# Patient Record
Sex: FEMALE | Race: WHITE | NOT HISPANIC OR LATINO
[De-identification: names, ages, dates, MRNs, and addresses within clinical notes are randomized per-mention and may not be internally consistent; named-entity substitution may affect disease eponyms.]

---

## 2017-08-07 PROBLEM — Z00.00 ENCOUNTER FOR PREVENTIVE HEALTH EXAMINATION: Status: ACTIVE | Noted: 2017-08-07

## 2017-09-14 ENCOUNTER — APPOINTMENT (OUTPATIENT)
Age: 57
End: 2017-09-14
Payer: COMMERCIAL

## 2017-09-14 PROCEDURE — 93000 ELECTROCARDIOGRAM COMPLETE: CPT

## 2017-09-14 PROCEDURE — 99244 OFF/OP CNSLTJ NEW/EST MOD 40: CPT

## 2017-09-19 PROCEDURE — 93224 XTRNL ECG REC UP TO 48 HRS: CPT

## 2017-09-21 ENCOUNTER — APPOINTMENT (OUTPATIENT)
Age: 57
End: 2017-09-21
Payer: COMMERCIAL

## 2017-09-21 PROCEDURE — 99214 OFFICE O/P EST MOD 30 MIN: CPT

## 2017-10-31 ENCOUNTER — RECORD ABSTRACTING (OUTPATIENT)
Age: 57
End: 2017-10-31

## 2017-10-31 DIAGNOSIS — Z82.49 FAMILY HISTORY OF ISCHEMIC HEART DISEASE AND OTHER DISEASES OF THE CIRCULATORY SYSTEM: ICD-10-CM

## 2017-10-31 DIAGNOSIS — Z83.49 FAMILY HISTORY OF OTHER ENDOCRINE, NUTRITIONAL AND METABOLIC DISEASES: ICD-10-CM

## 2017-10-31 DIAGNOSIS — Z87.39 PERSONAL HISTORY OF OTHER DISEASES OF THE MUSCULOSKELETAL SYSTEM AND CONNECTIVE TISSUE: ICD-10-CM

## 2017-10-31 DIAGNOSIS — Z82.0 FAMILY HISTORY OF EPILEPSY AND OTHER DISEASES OF THE NERVOUS SYSTEM: ICD-10-CM

## 2017-10-31 DIAGNOSIS — M19.90 UNSPECIFIED OSTEOARTHRITIS, UNSPECIFIED SITE: ICD-10-CM

## 2017-10-31 DIAGNOSIS — Z87.09 PERSONAL HISTORY OF OTHER DISEASES OF THE RESPIRATORY SYSTEM: ICD-10-CM

## 2017-10-31 DIAGNOSIS — Z80.1 FAMILY HISTORY OF MALIGNANT NEOPLASM OF TRACHEA, BRONCHUS AND LUNG: ICD-10-CM

## 2017-10-31 DIAGNOSIS — Z82.5 FAMILY HISTORY OF ASTHMA AND OTHER CHRONIC LOWER RESPIRATORY DISEASES: ICD-10-CM

## 2017-10-31 RX ORDER — MULTIVITAMIN
CAPSULE ORAL
Refills: 0 | Status: ACTIVE | COMMUNITY

## 2017-10-31 RX ORDER — ALBUTEROL SULFATE 90 UG/1
108 AEROSOL, METERED RESPIRATORY (INHALATION)
Refills: 0 | Status: ACTIVE | COMMUNITY

## 2017-10-31 RX ORDER — UBIDECARENONE/VIT E ACET 100MG-5
50 MCG CAPSULE ORAL
Refills: 0 | Status: ACTIVE | COMMUNITY

## 2017-10-31 RX ORDER — FLUTICASONE PROPIONATE 50 MCG
50 SPRAY, SUSPENSION NASAL DAILY
Refills: 0 | Status: ACTIVE | COMMUNITY

## 2017-10-31 RX ORDER — TELMISARTAN 80 MG/1
80 TABLET ORAL DAILY
Refills: 0 | Status: ACTIVE | COMMUNITY

## 2017-11-07 ENCOUNTER — APPOINTMENT (OUTPATIENT)
Age: 57
End: 2017-11-07
Payer: COMMERCIAL

## 2017-11-07 PROCEDURE — 93880 EXTRACRANIAL BILAT STUDY: CPT

## 2017-11-07 PROCEDURE — 93306 TTE W/DOPPLER COMPLETE: CPT

## 2017-11-10 ENCOUNTER — TRANSCRIPTION ENCOUNTER (OUTPATIENT)
Age: 57
End: 2017-11-10

## 2017-12-07 ENCOUNTER — APPOINTMENT (OUTPATIENT)
Age: 57
End: 2017-12-07
Payer: COMMERCIAL

## 2017-12-07 VITALS
HEIGHT: 64 IN | OXYGEN SATURATION: 97 % | HEART RATE: 80 BPM | BODY MASS INDEX: 47.8 KG/M2 | SYSTOLIC BLOOD PRESSURE: 132 MMHG | DIASTOLIC BLOOD PRESSURE: 80 MMHG | WEIGHT: 280 LBS

## 2017-12-07 PROCEDURE — 99214 OFFICE O/P EST MOD 30 MIN: CPT

## 2018-02-22 ENCOUNTER — APPOINTMENT (OUTPATIENT)
Dept: CARDIOLOGY | Facility: CLINIC | Age: 58
End: 2018-02-22
Payer: COMMERCIAL

## 2018-02-22 ENCOUNTER — RECORD ABSTRACTING (OUTPATIENT)
Age: 58
End: 2018-02-22

## 2018-02-22 VITALS
BODY MASS INDEX: 46.78 KG/M2 | SYSTOLIC BLOOD PRESSURE: 120 MMHG | OXYGEN SATURATION: 98 % | HEIGHT: 64 IN | HEART RATE: 81 BPM | WEIGHT: 274 LBS | DIASTOLIC BLOOD PRESSURE: 70 MMHG

## 2018-02-22 PROCEDURE — 99214 OFFICE O/P EST MOD 30 MIN: CPT

## 2018-02-22 RX ORDER — MONTELUKAST SODIUM 10 MG/1
10 TABLET, FILM COATED ORAL DAILY
Refills: 0 | Status: DISCONTINUED | COMMUNITY
End: 2018-02-22

## 2018-02-22 RX ORDER — TOFACITINIB 11 MG/1
11 TABLET, FILM COATED, EXTENDED RELEASE ORAL DAILY
Refills: 0 | Status: DISCONTINUED | COMMUNITY
End: 2018-02-22

## 2018-02-23 ENCOUNTER — RX RENEWAL (OUTPATIENT)
Age: 58
End: 2018-02-23

## 2018-07-02 ENCOUNTER — MEDICATION RENEWAL (OUTPATIENT)
Age: 58
End: 2018-07-02

## 2018-10-08 ENCOUNTER — RECORD ABSTRACTING (OUTPATIENT)
Age: 58
End: 2018-10-08

## 2018-10-11 ENCOUNTER — APPOINTMENT (OUTPATIENT)
Dept: CARDIOLOGY | Facility: CLINIC | Age: 58
End: 2018-10-11
Payer: COMMERCIAL

## 2018-10-11 ENCOUNTER — MEDICATION RENEWAL (OUTPATIENT)
Age: 58
End: 2018-10-11

## 2018-10-11 VITALS
WEIGHT: 266 LBS | SYSTOLIC BLOOD PRESSURE: 114 MMHG | HEIGHT: 64 IN | BODY MASS INDEX: 45.41 KG/M2 | DIASTOLIC BLOOD PRESSURE: 70 MMHG | HEART RATE: 86 BPM | OXYGEN SATURATION: 96 %

## 2018-10-11 PROCEDURE — 99214 OFFICE O/P EST MOD 30 MIN: CPT

## 2018-10-11 PROCEDURE — 93000 ELECTROCARDIOGRAM COMPLETE: CPT

## 2018-10-11 RX ORDER — FOLIC ACID 1 MG/1
1 TABLET ORAL DAILY
Refills: 0 | Status: DISCONTINUED | COMMUNITY
End: 2018-10-11

## 2018-10-11 RX ORDER — MULTIVIT-MIN/IRON/FOLIC ACID/K 18-600-40
CAPSULE ORAL DAILY
Refills: 0 | Status: ACTIVE | COMMUNITY

## 2018-10-16 PROCEDURE — 93224 XTRNL ECG REC UP TO 48 HRS: CPT

## 2018-10-29 ENCOUNTER — APPOINTMENT (OUTPATIENT)
Dept: CARDIOLOGY | Facility: CLINIC | Age: 58
End: 2018-10-29
Payer: COMMERCIAL

## 2018-10-29 PROCEDURE — 93880 EXTRACRANIAL BILAT STUDY: CPT

## 2018-10-29 PROCEDURE — 93306 TTE W/DOPPLER COMPLETE: CPT

## 2018-11-12 ENCOUNTER — APPOINTMENT (OUTPATIENT)
Dept: CARDIOLOGY | Facility: CLINIC | Age: 58
End: 2018-11-12
Payer: COMMERCIAL

## 2018-11-12 PROCEDURE — 78452 HT MUSCLE IMAGE SPECT MULT: CPT

## 2018-11-12 PROCEDURE — 93015 CV STRESS TEST SUPVJ I&R: CPT

## 2018-11-12 PROCEDURE — A9502: CPT

## 2018-12-05 ENCOUNTER — RX RENEWAL (OUTPATIENT)
Age: 58
End: 2018-12-05

## 2018-12-18 ENCOUNTER — RECORD ABSTRACTING (OUTPATIENT)
Age: 58
End: 2018-12-18

## 2018-12-20 ENCOUNTER — APPOINTMENT (OUTPATIENT)
Dept: CARDIOLOGY | Facility: CLINIC | Age: 58
End: 2018-12-20
Payer: COMMERCIAL

## 2018-12-20 VITALS
BODY MASS INDEX: 44.39 KG/M2 | HEART RATE: 80 BPM | DIASTOLIC BLOOD PRESSURE: 70 MMHG | OXYGEN SATURATION: 98 % | SYSTOLIC BLOOD PRESSURE: 140 MMHG | HEIGHT: 64 IN | WEIGHT: 260 LBS

## 2018-12-20 PROCEDURE — 99214 OFFICE O/P EST MOD 30 MIN: CPT

## 2019-01-17 ENCOUNTER — MEDICATION RENEWAL (OUTPATIENT)
Age: 59
End: 2019-01-17

## 2019-04-18 ENCOUNTER — TRANSCRIPTION ENCOUNTER (OUTPATIENT)
Age: 59
End: 2019-04-18

## 2019-07-01 ENCOUNTER — APPOINTMENT (OUTPATIENT)
Dept: CARDIOLOGY | Facility: CLINIC | Age: 59
End: 2019-07-01
Payer: COMMERCIAL

## 2019-07-01 VITALS
BODY MASS INDEX: 47.8 KG/M2 | HEIGHT: 64 IN | OXYGEN SATURATION: 97 % | DIASTOLIC BLOOD PRESSURE: 80 MMHG | SYSTOLIC BLOOD PRESSURE: 112 MMHG | WEIGHT: 280 LBS | HEART RATE: 86 BPM

## 2019-07-01 DIAGNOSIS — Z87.898 PERSONAL HISTORY OF OTHER SPECIFIED CONDITIONS: ICD-10-CM

## 2019-07-01 DIAGNOSIS — I34.0 NONRHEUMATIC MITRAL (VALVE) INSUFFICIENCY: ICD-10-CM

## 2019-07-01 DIAGNOSIS — I07.1 RHEUMATIC TRICUSPID INSUFFICIENCY: ICD-10-CM

## 2019-07-01 DIAGNOSIS — E06.3 AUTOIMMUNE THYROIDITIS: ICD-10-CM

## 2019-07-01 PROCEDURE — 99215 OFFICE O/P EST HI 40 MIN: CPT

## 2019-11-04 ENCOUNTER — APPOINTMENT (OUTPATIENT)
Dept: CARDIOLOGY | Facility: CLINIC | Age: 59
End: 2019-11-04
Payer: COMMERCIAL

## 2019-11-04 ENCOUNTER — NON-APPOINTMENT (OUTPATIENT)
Age: 59
End: 2019-11-04

## 2019-11-04 VITALS
HEIGHT: 64 IN | BODY MASS INDEX: 50.02 KG/M2 | OXYGEN SATURATION: 98 % | WEIGHT: 293 LBS | SYSTOLIC BLOOD PRESSURE: 130 MMHG | HEART RATE: 83 BPM | DIASTOLIC BLOOD PRESSURE: 72 MMHG

## 2019-11-04 PROCEDURE — 99214 OFFICE O/P EST MOD 30 MIN: CPT

## 2019-11-04 PROCEDURE — 93000 ELECTROCARDIOGRAM COMPLETE: CPT

## 2019-11-04 NOTE — REVIEW OF SYSTEMS
[Feeling Fatigued] : feeling fatigued [Blurry Vision] : blurred vision [Heartburn] : heartburn [Joint Pain] : joint pain [Negative] : Heme/Lymph [Shortness Of Breath] : no shortness of breath [Dyspnea on exertion] : dyspnea during exertion [Palpitations] : palpitations

## 2019-11-04 NOTE — PHYSICAL EXAM
[Normal Appearance] : normal appearance [Eyelids - No Xanthelasma] : the eyelids demonstrated no xanthelasmas [No Oral Pallor] : no oral pallor [Normal Jugular Venous V Waves Present] : normal jugular venous V waves present [Respiration, Rhythm And Depth] : normal respiratory rhythm and effort [Heart Rate And Rhythm] : heart rate and rhythm were normal [Heart Sounds] : normal S1 and S2 [Bowel Sounds] : normal bowel sounds [Abdomen Soft] : soft [Abnormal Walk] : normal gait [Nail Clubbing] : no clubbing of the fingernails [Cyanosis, Localized] : no localized cyanosis [] : no rash [Oriented To Time, Place, And Person] : oriented to person, place, and time [FreeTextEntry1] : obesity

## 2019-11-04 NOTE — REASON FOR VISIT
[Follow-Up - Clinic] : a clinic follow-up of [FreeTextEntry2] : palpitatons, dizziness, HTN, HL, obestity, KEVIN

## 2019-11-04 NOTE — DISCUSSION/SUMMARY
[FreeTextEntry1] : Willy is a 59-year-old female with medical history detailed above and active medical issues including:\par \par - Patient has dyspnea with moderate exertion normal perfusion Myoview stress test normal LVEF November 2018\par \par - Palpitations on diltiazem. Holter monitor for evaluation of possible arrhythmia.  Patient will have 2-D echocardiogram to assess for  LV systolic function, wall motion and  structural heart disease. \par \par - Hyperlipidemia on atorvastatin well-tolerated\par \par - Obesity. Discussed calorie reduction and increased exercise as a weight reduction strategy.\par \par - Sleep apnea using CPAP\par \par - History of thyroid nodule followed by endocrinologist\par \par Advised patient to follow active lifestyle with regular cardiovascular exercise. Patient educated on lifestyle and diet modification with low sodium low fat diet and avoidance of excessive alcohol. Patient is aware to call with any symptoms or concerns. \par \par Patient will be seen in cardiology follow up 6 months. Current cardiac medications remain unchanged. Repeat labs will be ordered with PMD.\par \par Willy will followup with Dr Jomar Teague for primary care\par \par

## 2019-11-04 NOTE — HISTORY OF PRESENT ILLNESS
[FreeTextEntry1] : Willy is a 59-year-old female with history of hypertension, hyperlipidemia, palpitations, obesity,  KEVIN using CPAP, thyroid nodule, MAZARIEGOS, atypical chest pain.\par \par Patient has dyspnea on exertion, palpitations, fluttering no associated symptoms. Cardiovascular review of symptoms is negative for exertional chest pain, dizziness or syncope.  No PND or orthopnea leg edema.  No bleeding or black stool.\par \par Patient is walking less than 10 minutes with chronic left knee pain prior arthroscopy.  Patient has recent sinus infection completed 2 courses of antibiotic therapy and steroid taper. \par \par \par \par

## 2019-11-04 NOTE — ASSESSMENT
[FreeTextEntry1] : \par Chest pain.  Overall seems to be atypical, most likely from GERD.  She has been advised to avoid citrus fruit juices.  Also, I told her not to eat three hours prior to going to bed.  Repeat GI consultation.\par \par Shortness of breath on more than usual exertion.  She had nuclear stress test on November 12, 2018 which was negative and echocardiogram showed normal LV size and wall motion and normal LVEF.  No need to repeat noninvasive cardiac testing at this point.\par \par Hypertension.  Low-salt diet.  Continue current medication.\par \par Dyslipidemia.  Low-cholesterol diet.  Continue current medication.\par \par Hypothyroidism.  Continue current medication.\par \par Obesity.  Weight reduction by diet and exercise.\par \par Sleep apnea syndrome.  Compliance to CPAP has been stressed upon her.\par \par Aggressive risk factor modification has been discussed.  She will be reevaluated by us in three months.\par

## 2019-11-06 PROCEDURE — 93224 XTRNL ECG REC UP TO 48 HRS: CPT

## 2019-12-16 ENCOUNTER — APPOINTMENT (OUTPATIENT)
Dept: CARDIOLOGY | Facility: CLINIC | Age: 59
End: 2019-12-16
Payer: COMMERCIAL

## 2019-12-16 PROCEDURE — 93306 TTE W/DOPPLER COMPLETE: CPT

## 2019-12-23 ENCOUNTER — APPOINTMENT (OUTPATIENT)
Dept: CARDIOLOGY | Facility: CLINIC | Age: 59
End: 2019-12-23

## 2020-04-06 ENCOUNTER — TRANSCRIPTION ENCOUNTER (OUTPATIENT)
Age: 60
End: 2020-04-06

## 2020-07-10 ENCOUNTER — TRANSCRIPTION ENCOUNTER (OUTPATIENT)
Age: 60
End: 2020-07-10

## 2020-08-03 ENCOUNTER — APPOINTMENT (OUTPATIENT)
Dept: CARDIOLOGY | Facility: CLINIC | Age: 60
End: 2020-08-03
Payer: COMMERCIAL

## 2020-08-11 ENCOUNTER — APPOINTMENT (OUTPATIENT)
Dept: CARDIOLOGY | Facility: CLINIC | Age: 60
End: 2020-08-11
Payer: COMMERCIAL

## 2020-08-11 VITALS
SYSTOLIC BLOOD PRESSURE: 132 MMHG | DIASTOLIC BLOOD PRESSURE: 84 MMHG | OXYGEN SATURATION: 97 % | BODY MASS INDEX: 50.02 KG/M2 | TEMPERATURE: 97.8 F | WEIGHT: 293 LBS | HEART RATE: 93 BPM | HEIGHT: 64 IN

## 2020-08-11 PROCEDURE — 99214 OFFICE O/P EST MOD 30 MIN: CPT

## 2020-08-11 NOTE — ADDENDUM
[FreeTextEntry1] : Please note the patient was reviewed with the PA.\par I was physically present during the service of the patient\par I was directly involved in the management plan and recommendations of care provided to the patient. \par I personally reviewed the history and physical exam and plan as documented by the PA above.\par \par Pardeep Flowers DO, FACC, RPVI\par Cardiologist\par 08/11/2020\par \par

## 2020-08-11 NOTE — PHYSICAL EXAM
[Normal Appearance] : normal appearance [No Deformities] : no deformities [] : no respiratory distress [Respiration, Rhythm And Depth] : normal respiratory rhythm and effort [Heart Rate And Rhythm] : heart rate and rhythm were normal [Heart Sounds] : normal S1 and S2 [Bowel Sounds] : normal bowel sounds [Abdomen Soft] : soft [Skin Color & Pigmentation] : normal skin color and pigmentation [Mood] : the mood was normal [Affect] : the affect was normal [FreeTextEntry1] : minimal edema with 2+ b/l l/e distal pulse

## 2020-08-11 NOTE — HISTORY OF PRESENT ILLNESS
[FreeTextEntry1] : Mrs. Ng is a pleasant 60 year old female that came in today 8-11-20 for routine follow up. \par \par As you know she has a history of hypertension, hyperlipidemia, palpitations, obesity,  KEVIN using CPAP, thyroid nodule, MAZARIEGOS, h/o atypical chest pain, asthma, family h/o CAD and asthma. (+covid May, June 8th negative)\par \par Her dyspnea has improved since last visit (she says it's not much to speak of), it is noted to be mild on exertion (upstairs, rarely at rest, increase with humidity). Note h/o asthma, followed by Dr. Funk, pulmonary (has apt in September). \par Rare positional dizziness with bending over, no syncope, chest pain,  palpitations or orthopnea.  \par \par Limited with exertion due to her knee (pending surgical repair end of august). \par She has been on and off prednisone 6x since march (with covid and sinus infections), she in turn continues to gain weight and can't lose it. \par \par Labs/tests\par labs 7-28-20 glucose 105, bun/creat 12/0.83, Na+ 142, K 4.3, AST 15, aLT 15, LDL 84, triglycerides 377, HDL 42\par Dual 11-12-18 mod yair 10min 6sec with mild defect in aprical wall that is fixed (prominent apical cleft-normal variant)\par echo 12-16-19 ef 60-65% with normal ventriclar functioni. mild LVH, mild MR/tr, minimal pr, mild phtn pasp 43mmhg. \par carotid ul/s 10- minimal thickening\par

## 2020-08-11 NOTE — REVIEW OF SYSTEMS
[Shortness Of Breath] : shortness of breath [Chest Pain] : no chest pain [Palpitations] : no palpitations [Dizziness] : no dizziness

## 2020-12-08 ENCOUNTER — APPOINTMENT (OUTPATIENT)
Dept: CARDIOLOGY | Facility: CLINIC | Age: 60
End: 2020-12-08

## 2021-02-15 ENCOUNTER — APPOINTMENT (OUTPATIENT)
Dept: CARDIOLOGY | Facility: CLINIC | Age: 61
End: 2021-02-15
Payer: COMMERCIAL

## 2021-02-15 VITALS
BODY MASS INDEX: 50.02 KG/M2 | TEMPERATURE: 97.6 F | HEIGHT: 64 IN | HEART RATE: 90 BPM | WEIGHT: 293 LBS | OXYGEN SATURATION: 97 % | SYSTOLIC BLOOD PRESSURE: 126 MMHG | DIASTOLIC BLOOD PRESSURE: 80 MMHG

## 2021-02-15 PROCEDURE — 99072 ADDL SUPL MATRL&STAF TM PHE: CPT

## 2021-02-15 PROCEDURE — 99214 OFFICE O/P EST MOD 30 MIN: CPT

## 2021-02-15 PROCEDURE — 93000 ELECTROCARDIOGRAM COMPLETE: CPT

## 2021-02-15 RX ORDER — BUDESONIDE AND FORMOTEROL FUMARATE DIHYDRATE 80; 4.5 UG/1; UG/1
80-4.5 AEROSOL RESPIRATORY (INHALATION) DAILY
Refills: 0 | Status: DISCONTINUED | COMMUNITY
End: 2021-02-15

## 2021-02-15 NOTE — REVIEW OF SYSTEMS
[Feeling Fatigued] : feeling fatigued [Blurry Vision] : blurred vision [Dyspnea on exertion] : dyspnea during exertion [Palpitations] : palpitations [Heartburn] : heartburn [Joint Pain] : joint pain [Negative] : Heme/Lymph [Shortness Of Breath] : no shortness of breath

## 2021-02-15 NOTE — PHYSICAL EXAM
[Normal Appearance] : normal appearance [Eyelids - No Xanthelasma] : the eyelids demonstrated no xanthelasmas [No Oral Pallor] : no oral pallor [Normal Jugular Venous V Waves Present] : normal jugular venous V waves present [Respiration, Rhythm And Depth] : normal respiratory rhythm and effort [Heart Rate And Rhythm] : heart rate and rhythm were normal [Bowel Sounds] : normal bowel sounds [Heart Sounds] : normal S1 and S2 [Abdomen Soft] : soft [Abnormal Walk] : normal gait [Nail Clubbing] : no clubbing of the fingernails [Cyanosis, Localized] : no localized cyanosis [] : no rash [Oriented To Time, Place, And Person] : oriented to person, place, and time [FreeTextEntry1] : obesity

## 2021-02-15 NOTE — DISCUSSION/SUMMARY
[FreeTextEntry1] : Willy is a 60-year-old female with medical history detailed above and active medical issues including:\par \par - Patient has dyspnea with moderate exertion normal perfusion Myoview stress test normal LVEF November 2018.  In the setting of Covid 19 pandemic we will discuss the need for stress testing next office visit.\par \par -Less frequent palpitations on diltiazem.  Patient will have 2-D echocardiogram to assess for  LV systolic function, wall motion and  structural heart disease. \par \par - Hyperlipidemia on atorvastatin well-tolerated\par \par - Obesity. Discussed calorie reduction and increased exercise as a weight reduction strategy.\par \par - Sleep apnea using CPAP\par \par - History of thyroid nodule followed by endocrinologist\par \par Advised patient to follow active lifestyle with regular cardiovascular exercise. Patient educated on lifestyle and diet modification with low sodium low fat diet and avoidance of excessive alcohol. Patient is aware to call with any symptoms or concerns. \par \par Patient will have 2-D echocardiogram to assess LV systolic function, structural heart disease.  Carotid Doppler to assess for PVD with TEB followup.  Patient will be seen in cardiology follow up 6 months. Current cardiac medications remain unchanged. Repeat labs will be ordered with PMD.\par \par Willy will followup with Dr Jomar Teague for primary care\par \par

## 2021-02-15 NOTE — HISTORY OF PRESENT ILLNESS
[FreeTextEntry1] : Willy is a 59-year-old female with history of hypertension, hyperlipidemia, palpitations, obesity,  KEVIN using CPAP, thyroid nodule, MAZARIEGOS, atypical chest pain.\par \par Patient has dyspnea on exertion, palpitations, fluttering no associated symptoms. Cardiovascular review of symptoms is negative for exertional chest pain, dizziness or syncope.  No PND or orthopnea leg edema.  No bleeding or black stool.\par \par Patient is walking less than 10 minutes with chronic left knee pain prior arthroscopy.  Patient has recent sinus infection completed 2 courses of antibiotic therapy and steroid taper. \par \par \par Echocardiogram December 2019, LVEF 60 to 65%, mild MR and TR, mild pulmonary hypertension.\par \par Exercise Myoview stress test November 2018, LVEF 66%, normal perfusion, "cleft seen, no ischemia.\par \par \par

## 2021-02-23 ENCOUNTER — APPOINTMENT (OUTPATIENT)
Dept: CARDIOLOGY | Facility: CLINIC | Age: 61
End: 2021-02-23
Payer: COMMERCIAL

## 2021-02-23 PROCEDURE — 93306 TTE W/DOPPLER COMPLETE: CPT

## 2021-02-23 PROCEDURE — 99072 ADDL SUPL MATRL&STAF TM PHE: CPT

## 2021-02-23 PROCEDURE — 93880 EXTRACRANIAL BILAT STUDY: CPT

## 2021-03-09 ENCOUNTER — APPOINTMENT (OUTPATIENT)
Dept: CARDIOLOGY | Facility: CLINIC | Age: 61
End: 2021-03-09
Payer: COMMERCIAL

## 2021-03-09 PROCEDURE — 99443: CPT

## 2021-05-13 ENCOUNTER — APPOINTMENT (OUTPATIENT)
Dept: PULMONOLOGY | Facility: CLINIC | Age: 61
End: 2021-05-13
Payer: COMMERCIAL

## 2021-05-13 VITALS
DIASTOLIC BLOOD PRESSURE: 83 MMHG | SYSTOLIC BLOOD PRESSURE: 137 MMHG | TEMPERATURE: 97.4 F | OXYGEN SATURATION: 99 % | HEART RATE: 76 BPM

## 2021-05-13 PROCEDURE — 99214 OFFICE O/P EST MOD 30 MIN: CPT

## 2021-05-13 PROCEDURE — 99072 ADDL SUPL MATRL&STAF TM PHE: CPT

## 2021-05-13 RX ORDER — CEFDINIR 300 MG/1
300 CAPSULE ORAL
Qty: 28 | Refills: 0 | Status: DISCONTINUED | COMMUNITY
Start: 2021-01-23 | End: 2021-05-13

## 2021-05-13 RX ORDER — FAMOTIDINE 20 MG/1
20 TABLET, FILM COATED ORAL
Qty: 180 | Refills: 0 | Status: DISCONTINUED | COMMUNITY
Start: 2020-08-14 | End: 2021-05-13

## 2021-05-13 RX ORDER — PREDNISONE 50 MG/1
50 TABLET ORAL
Qty: 7 | Refills: 0 | Status: DISCONTINUED | COMMUNITY
Start: 2021-01-27 | End: 2021-05-13

## 2021-05-13 RX ORDER — METHYLPREDNISOLONE 4 MG/1
4 TABLET ORAL
Qty: 21 | Refills: 0 | Status: DISCONTINUED | COMMUNITY
Start: 2020-10-14 | End: 2021-05-13

## 2021-05-13 RX ORDER — LEVOFLOXACIN 500 MG/1
500 TABLET, FILM COATED ORAL
Qty: 7 | Refills: 0 | Status: DISCONTINUED | COMMUNITY
Start: 2020-10-14 | End: 2021-05-13

## 2021-05-13 RX ORDER — METHOCARBAMOL 500 MG/1
500 TABLET, FILM COATED ORAL
Qty: 20 | Refills: 0 | Status: DISCONTINUED | COMMUNITY
Start: 2020-11-28 | End: 2021-05-13

## 2021-05-13 RX ORDER — NAPROXEN 500 MG/1
500 TABLET ORAL
Qty: 20 | Refills: 0 | Status: DISCONTINUED | COMMUNITY
Start: 2020-11-28 | End: 2021-05-13

## 2021-05-13 RX ORDER — PREDNISONE 20 MG/1
20 TABLET ORAL
Qty: 6 | Refills: 0 | Status: DISCONTINUED | COMMUNITY
Start: 2021-01-23 | End: 2021-05-13

## 2021-05-13 RX ORDER — GABAPENTIN 300 MG/1
300 CAPSULE ORAL
Qty: 20 | Refills: 0 | Status: DISCONTINUED | COMMUNITY
Start: 2020-11-28 | End: 2021-05-13

## 2021-05-13 RX ORDER — LEVOFLOXACIN 750 MG/1
750 TABLET, FILM COATED ORAL
Qty: 7 | Refills: 0 | Status: DISCONTINUED | COMMUNITY
Start: 2021-01-25 | End: 2021-05-13

## 2021-05-13 RX ORDER — OXYCODONE AND ACETAMINOPHEN 5; 325 MG/1; MG/1
5-325 TABLET ORAL
Qty: 30 | Refills: 0 | Status: DISCONTINUED | COMMUNITY
Start: 2020-08-27 | End: 2021-05-13

## 2021-05-13 RX ORDER — AMOXICILLIN AND CLAVULANATE POTASSIUM 875; 125 MG/1; MG/1
875-125 TABLET, COATED ORAL
Qty: 28 | Refills: 0 | Status: DISCONTINUED | COMMUNITY
Start: 2020-12-01 | End: 2021-05-13

## 2021-05-13 RX ORDER — BENZONATATE 200 MG/1
200 CAPSULE ORAL
Qty: 20 | Refills: 0 | Status: DISCONTINUED | COMMUNITY
Start: 2021-01-23 | End: 2021-05-13

## 2021-05-13 NOTE — PHYSICAL EXAM
[No Acute Distress] : no acute distress [Normal Oropharynx] : normal oropharynx [Normal Appearance] : normal appearance [No Neck Mass] : no neck mass [Normal Rate/Rhythm] : normal rate/rhythm [Normal S1, S2] : normal s1, s2 [No Murmurs] : no murmurs [No Resp Distress] : no resp distress [Clear to Auscultation Bilaterally] : clear to auscultation bilaterally [No Abnormalities] : no abnormalities [Benign] : benign [Normal Gait] : normal gait [No Clubbing] : no clubbing [No Cyanosis] : no cyanosis [No Edema] : no edema [FROM] : FROM [Normal Color/ Pigmentation] : normal color/ pigmentation [No Focal Deficits] : no focal deficits [Oriented x3] : oriented x3 [Normal Affect] : normal affect [TextBox_2] : heavy set

## 2021-05-13 NOTE — DISCUSSION/SUMMARY
[FreeTextEntry1] : pt with asthma and overall minimal worsening likely from acute sinusitis\par will start augmentin 875 bid #42\par will cont inc dose symbicort and singulair\par pt ot obtain blood test from allergist for me\par The patient understands and agrees with plan of care.\par Today's office visit encompassed 32 minutes. I conducted an extensive history ,physical exam and reviewed diagnosis and treatment options  including diagnostic tests,radiologic studies including  cat scans  and the use of prescription medication.

## 2021-05-13 NOTE — HISTORY OF PRESENT ILLNESS
[Never] : never [Obstructive Sleep Apnea] : obstructive sleep apnea [CPAP:] : CPAP [TextBox_4] : 61 female with hx asthma\par develop sinus infection  treated 4 weeks ago with  and now  with recurrent green drainage\par ?recent  dx CVID by immunologist\par no sob  intermittent dyspnea no wheeze  and using neb  worse since nasal drainage\par some dec in activity from knee pain not pulm  limitation\par no nite awakening and using cpap nitely\par allergist inc symbicort 160 2 bid and added singulair 1 qd \par

## 2021-08-23 ENCOUNTER — APPOINTMENT (OUTPATIENT)
Dept: CARDIOLOGY | Facility: CLINIC | Age: 61
End: 2021-08-23
Payer: MEDICARE

## 2021-08-23 ENCOUNTER — NON-APPOINTMENT (OUTPATIENT)
Age: 61
End: 2021-08-23

## 2021-08-23 VITALS
HEIGHT: 64 IN | WEIGHT: 293 LBS | HEART RATE: 81 BPM | DIASTOLIC BLOOD PRESSURE: 70 MMHG | SYSTOLIC BLOOD PRESSURE: 132 MMHG | BODY MASS INDEX: 50.02 KG/M2 | TEMPERATURE: 98.2 F | OXYGEN SATURATION: 97 %

## 2021-08-23 PROCEDURE — 99214 OFFICE O/P EST MOD 30 MIN: CPT

## 2021-08-23 PROCEDURE — 93000 ELECTROCARDIOGRAM COMPLETE: CPT

## 2021-08-23 NOTE — PHYSICAL EXAM
[Normal Appearance] : normal appearance [Eyelids - No Xanthelasma] : the eyelids demonstrated no xanthelasmas [No Oral Pallor] : no oral pallor [Normal Jugular Venous V Waves Present] : normal jugular venous V waves present [Heart Rate And Rhythm] : heart rate and rhythm were normal [Respiration, Rhythm And Depth] : normal respiratory rhythm and effort [Heart Sounds] : normal S1 and S2 [Bowel Sounds] : normal bowel sounds [Abdomen Soft] : soft [Abnormal Walk] : normal gait [Nail Clubbing] : no clubbing of the fingernails [Cyanosis, Localized] : no localized cyanosis [] : no rash [Oriented To Time, Place, And Person] : oriented to person, place, and time [Well Developed] : well developed [Well Nourished] : well nourished [No Acute Distress] : no acute distress [Obese] : obese [Normal Conjunctiva] : normal conjunctiva [Normal Venous Pressure] : normal venous pressure [No Carotid Bruit] : no carotid bruit [Normal S1, S2] : normal S1, S2 [No Murmur] : no murmur [No Rub] : no rub [No Gallop] : no gallop [Clear Lung Fields] : clear lung fields [Good Air Entry] : good air entry [No Respiratory Distress] : no respiratory distress  [Soft] : abdomen soft [Non Tender] : non-tender [No Masses/organomegaly] : no masses/organomegaly [Normal Bowel Sounds] : normal bowel sounds [Normal Gait] : normal gait [No Edema] : no edema [No Cyanosis] : no cyanosis [No Clubbing] : no clubbing [No Varicosities] : no varicosities [No Rash] : no rash [No Skin Lesions] : no skin lesions [Moves all extremities] : moves all extremities [No Focal Deficits] : no focal deficits [Normal Speech] : normal speech [Alert and Oriented] : alert and oriented [Normal memory] : normal memory [FreeTextEntry1] : obesity

## 2021-08-23 NOTE — DISCUSSION/SUMMARY
[FreeTextEntry1] : Willy is a 61-year-old female with medical history detailed above and active medical issues including:\par \par - Patient has dyspnea with moderate exertion concerning for angina, multiple CAD risk factors.  Patient will have noninvasive testing with a Lexiscan Myoview stress test to assess for obstructive CAD, exercise-induced arrhythmia,  blood pressure response.\par \par - Recurrent palpitations on diltiazem.  Zio patch 1 week heart monitor started today\par \par - Hyperlipidemia on atorvastatin well-tolerated\par \par - Obesity. Discussed calorie reduction and increased exercise as a weight reduction strategy.\par \par - Sleep apnea using CPAP\par \par - History of thyroid nodule followed by endocrinologist\par \par Advised patient to follow active lifestyle with regular cardiovascular exercise. Patient educated on lifestyle and diet modification with low sodium low fat diet and avoidance of excessive alcohol. Patient is aware to call with any symptoms or concerns. \par \par Patient will be seen in cardiology follow-up after noninvasive testing.  Current cardiac medications remain unchanged and renewals  are up to date. Repeat labs will be ordered with PMD.\par \par Willy will followup with Dr Jomar Teague for primary care\par \par

## 2021-09-15 ENCOUNTER — APPOINTMENT (OUTPATIENT)
Dept: PULMONOLOGY | Facility: CLINIC | Age: 61
End: 2021-09-15
Payer: MEDICARE

## 2021-09-15 VITALS
OXYGEN SATURATION: 98 % | DIASTOLIC BLOOD PRESSURE: 73 MMHG | HEIGHT: 64 IN | BODY MASS INDEX: 50.02 KG/M2 | HEART RATE: 87 BPM | SYSTOLIC BLOOD PRESSURE: 111 MMHG | TEMPERATURE: 97.6 F | WEIGHT: 293 LBS

## 2021-09-15 PROCEDURE — 99214 OFFICE O/P EST MOD 30 MIN: CPT

## 2021-09-15 RX ORDER — AZELASTINE HYDROCHLORIDE 137 UG/1
137 SPRAY, METERED NASAL
Refills: 0 | Status: DISCONTINUED | COMMUNITY
End: 2021-09-15

## 2021-09-15 RX ORDER — AMOXICILLIN AND CLAVULANATE POTASSIUM 875; 125 MG/1; MG/1
875-125 TABLET, COATED ORAL
Qty: 42 | Refills: 1 | Status: COMPLETED | COMMUNITY
Start: 2021-05-13 | End: 2021-09-15

## 2021-09-15 RX ORDER — RIFAXIMIN 550 MG/1
550 TABLET ORAL
Refills: 0 | Status: DISCONTINUED | COMMUNITY
End: 2021-09-15

## 2021-09-15 NOTE — PHYSICAL EXAM
[No Acute Distress] : no acute distress [Normal Oropharynx] : normal oropharynx [Normal Appearance] : normal appearance [No Neck Mass] : no neck mass [Normal Rate/Rhythm] : normal rate/rhythm [Normal S1, S2] : normal s1, s2 [No Murmurs] : no murmurs [No Resp Distress] : no resp distress [Clear to Auscultation Bilaterally] : clear to auscultation bilaterally [No Abnormalities] : no abnormalities [Benign] : benign [Normal Gait] : normal gait [No Cyanosis] : no cyanosis [No Clubbing] : no clubbing [No Edema] : no edema [FROM] : FROM [Normal Color/ Pigmentation] : normal color/ pigmentation [No Focal Deficits] : no focal deficits [Oriented x3] : oriented x3 [Normal Affect] : normal affect [TextBox_2] : obese female

## 2021-09-15 NOTE — DISCUSSION/SUMMARY
[FreeTextEntry1] : Ms Ng has asthma and is currently well controlled.  It seems to be exacerbated by her chronic recurrent sinus infections and hopefully after her sinus surgery this variable will be resolved and she will not have these exacerbating problems.  If she is stable several weeks after the sinus surgery will consider decreasing her to Symbicort 80  2 sprays twice a day.  Patient is having sinus surgery for sinus polyps.  I discussed with her the triad of sinus polyps asthma and aspirin sensitivity.  She should avoid aspirin and nonsteroidal anti-inflammatories as these could exacerbate her asthma.  She she does not take these medicines regularly and will avoid them.  The patient understands and agrees with this plan of care.\par The patient understands and agrees with plan of care.\par Today's office visit encompassed 32 minutes. I conducted an extensive history ,physical exam and reviewed diagnosis and treatment options  including diagnostic tests,radiologic studies including  cat scans  and the use of prescription medication.

## 2021-09-15 NOTE — HISTORY OF PRESENT ILLNESS
[Never] : never [TextBox_4] : 61 female with hx asthma and for sinus surgery by [facility] for recurrent polyps\par today breathing feels good  and 2 courses of abx and steroids\par today maintenance therapy\par no wheeze sl phlegm \par full activity \par no nite awakening\par using cpap nitely and feels  good\par needs new machine so repeat study

## 2021-09-15 NOTE — REASON FOR VISIT
[Follow-Up] : a follow-up visit [Asthma] : asthma [Sleep Apnea] : sleep apnea [Shortness of Breath] : shortness of breath [TextBox_44] : 4 month f/u

## 2021-09-16 ENCOUNTER — APPOINTMENT (OUTPATIENT)
Dept: CARDIOLOGY | Facility: CLINIC | Age: 61
End: 2021-09-16
Payer: MEDICARE

## 2021-09-16 PROCEDURE — A9502: CPT

## 2021-09-16 PROCEDURE — 78452 HT MUSCLE IMAGE SPECT MULT: CPT

## 2021-09-16 PROCEDURE — 93015 CV STRESS TEST SUPVJ I&R: CPT

## 2021-10-11 ENCOUNTER — TRANSCRIPTION ENCOUNTER (OUTPATIENT)
Age: 61
End: 2021-10-11

## 2021-11-05 ENCOUNTER — TRANSCRIPTION ENCOUNTER (OUTPATIENT)
Age: 61
End: 2021-11-05

## 2021-11-08 ENCOUNTER — TRANSCRIPTION ENCOUNTER (OUTPATIENT)
Age: 61
End: 2021-11-08

## 2021-11-15 ENCOUNTER — APPOINTMENT (OUTPATIENT)
Dept: CARDIOLOGY | Facility: CLINIC | Age: 61
End: 2021-11-15
Payer: MEDICARE

## 2021-11-15 VITALS
TEMPERATURE: 97.4 F | HEIGHT: 64 IN | HEART RATE: 89 BPM | SYSTOLIC BLOOD PRESSURE: 130 MMHG | BODY MASS INDEX: 50.02 KG/M2 | OXYGEN SATURATION: 97 % | WEIGHT: 293 LBS | DIASTOLIC BLOOD PRESSURE: 78 MMHG

## 2021-11-15 PROCEDURE — 99214 OFFICE O/P EST MOD 30 MIN: CPT

## 2021-11-15 RX ORDER — DUPILUMAB 300 MG/2ML
300 INJECTION, SOLUTION SUBCUTANEOUS
Refills: 0 | Status: ACTIVE | COMMUNITY

## 2021-11-15 NOTE — PHYSICAL EXAM
[Well Developed] : well developed [Well Nourished] : well nourished [No Acute Distress] : no acute distress [Obese] : obese [Normal Conjunctiva] : normal conjunctiva [Normal Venous Pressure] : normal venous pressure [No Carotid Bruit] : no carotid bruit [Normal S1, S2] : normal S1, S2 [No Murmur] : no murmur [No Rub] : no rub [No Gallop] : no gallop [Clear Lung Fields] : clear lung fields [Good Air Entry] : good air entry [No Respiratory Distress] : no respiratory distress  [Soft] : abdomen soft [Non Tender] : non-tender [No Masses/organomegaly] : no masses/organomegaly [Normal Bowel Sounds] : normal bowel sounds [Normal Gait] : normal gait [No Edema] : no edema [No Cyanosis] : no cyanosis [No Clubbing] : no clubbing [No Varicosities] : no varicosities [No Rash] : no rash [No Skin Lesions] : no skin lesions [Moves all extremities] : moves all extremities [No Focal Deficits] : no focal deficits [Normal Speech] : normal speech [Alert and Oriented] : alert and oriented [Normal memory] : normal memory [Normal Appearance] : normal appearance [Eyelids - No Xanthelasma] : the eyelids demonstrated no xanthelasmas [No Oral Pallor] : no oral pallor [Normal Jugular Venous V Waves Present] : normal jugular venous V waves present [Respiration, Rhythm And Depth] : normal respiratory rhythm and effort [Heart Rate And Rhythm] : heart rate and rhythm were normal [Heart Sounds] : normal S1 and S2 [Bowel Sounds] : normal bowel sounds [Abdomen Soft] : soft [Abnormal Walk] : normal gait [Nail Clubbing] : no clubbing of the fingernails [Cyanosis, Localized] : no localized cyanosis [] : no rash [Oriented To Time, Place, And Person] : oriented to person, place, and time [FreeTextEntry1] : obesity

## 2021-11-15 NOTE — HISTORY OF PRESENT ILLNESS
[FreeTextEntry1] : Willy is a 61-year-old female with history of hypertension, hyperlipidemia, palpitations, obesity,  KEVIN using CPAP, thyroid nodule, MAZARIEGOS, atypical chest pain.\par \par Patient has dyspnea on exertion unchanged.  Cardiovascular review of symptoms is negative for exertional chest pain, dyspnea, palpitations, dizziness or syncope.  No PND or orthopnea leg edema.  No bleeding or black stool.\par \par Patient is walking 15 minutes with chronic left knee pain prior arthroscopy. \par \par Patient has recent sinus infection completed 2 courses of antibiotic therapy and steroid taper. \par \par Lexiscan Myoview stress test Sept 2021 LVEF 69% with normal wall motion, small anterior apical defect in the presence of breast attenuation normal wall motion likely artifact, nonischemic EKG response, no chest pain, ventricular bigeminy with baseline sinus rhythm.\par \par Echocardiogram February 2021, LVEF 65%, normal Doppler, mild pHTN.\par \par Carotid Doppler February 2021, mild nonobstructive plaque.\par \par Zio patch 1 week heart monitor August 2021, sinus rhythm with average heart rate 82, brief SVT up to 193 bpm, rare PVCs\par \par Echocardiogram December 2019, LVEF 60 to 65%, mild MR and TR, mild pulmonary hypertension.\par \par Exercise Myoview stress test November 2018, LVEF 66%, normal perfusion, "cleft seen, no ischemia.\par \par \par

## 2021-11-15 NOTE — DISCUSSION/SUMMARY
[FreeTextEntry1] : Willy is a 61-year-old female with medical history detailed above and active medical issues including:\par \par - Patient has dyspnea with moderate exertion unchanged, no significant ischemia, normal LVEF review stress test Sept 2021\par \par - Recurrent palpitations ZioPatch with brief PSVT, rare PVCs on diltiazem , normal LVEF echo Feb 2021\par \par - Hyperlipidemia on atorvastatin well-tolerated\par \par - Obesity. Discussed calorie reduction and increased exercise as a weight reduction strategy.\par \par - Sleep apnea using CPAP\par \par - History of thyroid nodule followed by endocrinologist\par \par Advised patient to follow active lifestyle with regular cardiovascular exercise. Patient educated on lifestyle and diet modification with low sodium low fat diet and avoidance of excessive alcohol. Patient is aware to call with any symptoms or concerns. \par \par Patient will be seen in cardiology follow-up 6 months same day echocardiogram.  Current cardiac medications remain unchanged and renewals  are up to date. Repeat labs will be ordered with PMD.\par \par Willy will followup with Dr Jomar Teague for primary care\par \par

## 2022-01-06 ENCOUNTER — APPOINTMENT (OUTPATIENT)
Dept: PULMONOLOGY | Facility: CLINIC | Age: 62
End: 2022-01-06

## 2022-01-10 ENCOUNTER — TRANSCRIPTION ENCOUNTER (OUTPATIENT)
Age: 62
End: 2022-01-10

## 2022-01-26 ENCOUNTER — NON-APPOINTMENT (OUTPATIENT)
Age: 62
End: 2022-01-26

## 2022-01-26 ENCOUNTER — APPOINTMENT (OUTPATIENT)
Dept: PULMONOLOGY | Facility: CLINIC | Age: 62
End: 2022-01-26
Payer: MEDICARE

## 2022-01-26 VITALS
OXYGEN SATURATION: 99 % | SYSTOLIC BLOOD PRESSURE: 134 MMHG | HEIGHT: 64 IN | TEMPERATURE: 97.6 F | WEIGHT: 293 LBS | DIASTOLIC BLOOD PRESSURE: 83 MMHG | BODY MASS INDEX: 50.02 KG/M2 | HEART RATE: 69 BPM

## 2022-01-26 PROCEDURE — 99214 OFFICE O/P EST MOD 30 MIN: CPT

## 2022-01-26 RX ORDER — MONTELUKAST 10 MG/1
10 TABLET, FILM COATED ORAL
Refills: 0 | Status: ACTIVE | COMMUNITY

## 2022-01-26 NOTE — HISTORY OF PRESENT ILLNESS
[Never] : never [Obstructive Sleep Apnea] : obstructive sleep apnea [CPAP:] : CPAP [Nasal pillow] : nasal pillow [TextBox_4] : 61 female with hx asthma and had sinus surgery and covid +Jan 2 and treated monoclonal Ab and dced\par today feels good  no sob no cough no wheeze no cp no tightness\par some improvement with sinus surgery\par full activity no limitation\par Recently started on Dupixent by primary care physician and overall feels better\par using cpap  and recent repeat study at [facility]\par

## 2022-01-26 NOTE — REASON FOR VISIT
[Follow-Up] : a follow-up visit [Asthma] : asthma [Sleep Apnea] : sleep apnea [TextBox_44] : 4 months. Pt was diagnosed with COVID-19 01/02/2022, pt was not hospitalized but did received antibody infusion at [facility]. Currently pt has no pulmonary complaints.

## 2022-01-26 NOTE — PHYSICAL EXAM
[No Acute Distress] : no acute distress [Normal Oropharynx] : normal oropharynx [Normal Appearance] : normal appearance [No Neck Mass] : no neck mass [Normal Rate/Rhythm] : normal rate/rhythm [Normal S1, S2] : normal s1, s2 [No Murmurs] : no murmurs [No Resp Distress] : no resp distress [Clear to Auscultation Bilaterally] : clear to auscultation bilaterally [No Abnormalities] : no abnormalities [Benign] : benign [Normal Gait] : normal gait [No Clubbing] : no clubbing [No Cyanosis] : no cyanosis [No Edema] : no edema [FROM] : FROM [Normal Color/ Pigmentation] : normal color/ pigmentation [No Focal Deficits] : no focal deficits [Oriented x3] : oriented x3 [Normal Affect] : normal affect [TextBox_2] : Obese female no respiratory distress

## 2022-01-26 NOTE — DISCUSSION/SUMMARY
[FreeTextEntry1] : Ms. Ng has moderate persistent asthma.  Overall she is doing well and think she might be feeling better on the Dupixent as well.  I have discussed with her decreasing the Symbicort from 160 mg to 80 mg 2 sprays twice a day.  She is eager to try this and we will start it after the current prescription runs out within the next month.  We will obtain pulmonary function test on her next visit.  The patient is tolerating her CPAP machine well and is receiving a new machine from the [facility]. \par The patient understands and agrees with plan of care.\par Today's office visit encompassed 32 minutes. I conducted an extensive history ,physical exam and reviewed diagnosis and treatment options  including diagnostic tests,radiologic studies including  cat scans  and the use of prescription medication.

## 2022-02-04 ENCOUNTER — TRANSCRIPTION ENCOUNTER (OUTPATIENT)
Age: 62
End: 2022-02-04

## 2022-03-02 ENCOUNTER — TRANSCRIPTION ENCOUNTER (OUTPATIENT)
Age: 62
End: 2022-03-02

## 2022-03-16 ENCOUNTER — TRANSCRIPTION ENCOUNTER (OUTPATIENT)
Age: 62
End: 2022-03-16

## 2022-03-20 ENCOUNTER — TRANSCRIPTION ENCOUNTER (OUTPATIENT)
Age: 62
End: 2022-03-20

## 2022-05-17 ENCOUNTER — APPOINTMENT (OUTPATIENT)
Dept: CARDIOLOGY | Facility: CLINIC | Age: 62
End: 2022-05-17
Payer: MEDICARE

## 2022-05-17 VITALS
HEART RATE: 92 BPM | DIASTOLIC BLOOD PRESSURE: 88 MMHG | HEIGHT: 64 IN | SYSTOLIC BLOOD PRESSURE: 136 MMHG | OXYGEN SATURATION: 97 % | WEIGHT: 293 LBS | TEMPERATURE: 97.3 F | BODY MASS INDEX: 50.02 KG/M2

## 2022-05-17 PROCEDURE — 99214 OFFICE O/P EST MOD 30 MIN: CPT

## 2022-05-17 PROCEDURE — 93306 TTE W/DOPPLER COMPLETE: CPT

## 2022-05-17 RX ORDER — PEPPERMINT OIL 90 MG
90 CAPSULE, DELAYED, AND EXTENDED RELEASE ORAL
Refills: 0 | Status: DISCONTINUED | COMMUNITY
End: 2022-05-17

## 2022-05-17 RX ORDER — OMEPRAZOLE 40 MG/1
40 CAPSULE, DELAYED RELEASE ORAL TWICE DAILY
Refills: 0 | Status: DISCONTINUED | COMMUNITY
End: 2022-05-17

## 2022-05-17 RX ORDER — BUDESONIDE AND FORMOTEROL FUMARATE DIHYDRATE 160; 4.5 UG/1; UG/1
160-4.5 AEROSOL RESPIRATORY (INHALATION)
Qty: 10 | Refills: 0 | Status: DISCONTINUED | COMMUNITY
Start: 2021-02-03 | End: 2022-05-17

## 2022-05-17 NOTE — DISCUSSION/SUMMARY
[FreeTextEntry1] : SCOTTIE PADGETT is a 62 year old F who presents today May 17, 2022 with the above history and the following active issues:\par \par Chest pain. MAZARIEGOS. Multiple risk factors. Note significant family hx. Keny 9/2021 with tonny infarct ischemia. Recommend cardiac cath.\par \par Above testing has been reviewed with the patient. \par High risk noninvasive imaging. \par Discussed risk of major adverse cardiovascular events without further evaluation and management. \par Recommend cardiac catheterization.\par Discussed the risks, benefits, alternatives of invasive angiography.\par Discussed potential for revascularization, percutaneous v surgical\par All questions answered.\par If escalating sx or sx at rest then 911\par Daily aspirin therapy. \par \par HTN: Moderately well controlled on Dilt.\par \par \par HLD on statin\par \par \par KEVIN\par \par Thyroid nodule; follows with endo as per prior records.\par \par Ongoing f/u with PCP. Advised to f/u with PCP re: incidental findings on prior CTs in [facility] 2018 and 2019.\par \par F/U after testing to review results (cath).\par Discussed red flag symptoms, which would warrant sooner or emergent medical evaluation.\par Any questions and concerns were addressed and resolved.\par \par Sincerely,\par Christina Wood [facility]-BC\par Patient's history, testing, and plan was reviewed with supervising physician, Dr. Patrick Valentino

## 2022-05-17 NOTE — HISTORY OF PRESENT ILLNESS
[FreeTextEntry1] : SCOTTIE PADGETT is a 62 year old female with a past medical history of hypertension, hyperlipidemia, obesity, KEVIN using CPAP, thyroid nodule.\par \par Last seen 11/15/21. In the interim there have been no hospitalizations or procedures. Reports her brother  of a heart attack last week at the age of 68. She felt an episode of chest discomfort for 20 seconds. There is MAZARIEGOS. Reports edema at times. Denies palpitations, dizziness, lightheadedness, syncope, near syncope, and claudication. Never a smoker. Was exposed to second hand smoke.\par \par /80 on my exam.\par \par Testing:\par \par Echo 22: EF 60%- [place] to mild MR. Normal wall motion. RV NWVl grossly normal LRVSF. MIld AK. \par \par Nuke 21: Lexiscan. Negative by EKG. V bigem noted. Breast attenuation artifact. Medium sized defect that is partially reversible, suggestive of infarction with tonny infarct ischemia. EF 69%.\par \par Zio 21: SR  bpm, average HR 82 bpm. ?Brief SVT. Rare v ectopy.\par \par Labs 3/31/21: WBC 5.8, Hgb 13.4, HCT 41.4, plt 218, Cr 0.81, Na 143, K 4.3, Ca 9.4, Chol 147, Trigs 272, HDL 42, LDL 62, AST 26, ALT 24, A1C 5.8, TSH 1.63, CK 51.\par \par Carotid u/s 21: BL intimal thickening.\par \par Echo 21: EF 60-65%. Minimal MR. Normal wall motion.\par \par CT Cors 18: Coronary calcium score 0. Vague nodular opacities in left lower lobe. small hiatal hernia.

## 2022-05-20 DIAGNOSIS — Z01.818 ENCOUNTER FOR OTHER PREPROCEDURAL EXAMINATION: ICD-10-CM

## 2022-05-25 ENCOUNTER — APPOINTMENT (OUTPATIENT)
Dept: ORTHOPEDIC SURGERY | Facility: CLINIC | Age: 62
End: 2022-05-25
Payer: MEDICARE

## 2022-05-25 VITALS — HEIGHT: 64 IN | WEIGHT: 293 LBS | BODY MASS INDEX: 50.02 KG/M2

## 2022-05-25 DIAGNOSIS — M48.061 SPINAL STENOSIS, LUMBAR REGION WITHOUT NEUROGENIC CLAUDICATION: ICD-10-CM

## 2022-05-25 LAB — SARS-COV-2 N GENE NPH QL NAA+PROBE: NOT DETECTED

## 2022-05-25 PROCEDURE — 99204 OFFICE O/P NEW MOD 45 MIN: CPT

## 2022-05-25 NOTE — HISTORY OF PRESENT ILLNESS
[de-identified] : Patient presents for initial encounter with lower back pain that intermittently radiates into lower extremities. Patient states she has history of lower back pain. No history of trauma. Patient states her pain is worse with extended periods of standing and walking. No changes to lower extremity strength b/l. No numbness/tingling sensation to lower extremities b/l. Treatment with OTC NSAIDs provides temporary relief. Treatment with formal physical therapy makes her symptoms worse. Patient has continued with at home exercises/stretches as best tolerated.

## 2022-05-25 NOTE — DISCUSSION/SUMMARY
[Medication Risks Reviewed] : Medication risks reviewed [de-identified] : Discussed proper body mechanics and modified physical activity to avoid aggravation of symptoms. Patient will continue with at home exercises/stretches as best tolerated. Discussed further conservative treatment in the form of NSAIDs, muscle relaxers, and injection therapy. Reviewed and discussed results of lumbar spine MRI scan from 2019. Patient given prescriptions for meloxicam and methocarbamol. Advised patient of proper prescription medication management. Patient referred to Dr. Rojo to injection therapy consultation. Patient will follow up in 5-6 weeks.

## 2022-05-25 NOTE — DATA REVIEWED
[MRI] : MRI [Lumbar Spine] : lumbar spine [CT Scan] : CT scan [Report was reviewed and noted in the chart] : The report was reviewed and noted in the chart [I independently reviewed and interpreted images and report] : I independently reviewed and interpreted images and report [I reviewed the films/CD and additionally noted] : I reviewed the films/CD and additionally noted [FreeTextEntry2] : Abdomen and pelvis CT scan 05/24/2021. DDD at L2-3 and L3-4. Spinal stenosis at L2-3. [FreeTextEntry1] : I stop paperwork reviewed

## 2022-05-27 ENCOUNTER — OUTPATIENT (OUTPATIENT)
Dept: OUTPATIENT SERVICES | Facility: HOSPITAL | Age: 62
LOS: 1 days | End: 2022-05-27
Payer: MEDICARE

## 2022-05-27 PROCEDURE — 93458 L HRT ARTERY/VENTRICLE ANGIO: CPT | Mod: 26

## 2022-05-27 PROCEDURE — 93010 ELECTROCARDIOGRAM REPORT: CPT

## 2022-06-01 DIAGNOSIS — R00.2 PALPITATIONS: ICD-10-CM

## 2022-06-01 DIAGNOSIS — R94.39 ABNORMAL RESULT OF OTHER CARDIOVASCULAR FUNCTION STUDY: ICD-10-CM

## 2022-06-01 DIAGNOSIS — M06.9 RHEUMATOID ARTHRITIS, UNSPECIFIED: ICD-10-CM

## 2022-06-01 DIAGNOSIS — E03.9 HYPOTHYROIDISM, UNSPECIFIED: ICD-10-CM

## 2022-06-01 DIAGNOSIS — I34.0 NONRHEUMATIC MITRAL (VALVE) INSUFFICIENCY: ICD-10-CM

## 2022-06-01 DIAGNOSIS — G47.33 OBSTRUCTIVE SLEEP APNEA (ADULT) (PEDIATRIC): ICD-10-CM

## 2022-06-01 DIAGNOSIS — I10 ESSENTIAL (PRIMARY) HYPERTENSION: ICD-10-CM

## 2022-06-01 DIAGNOSIS — E78.5 HYPERLIPIDEMIA, UNSPECIFIED: ICD-10-CM

## 2022-06-01 DIAGNOSIS — J45.909 UNSPECIFIED ASTHMA, UNCOMPLICATED: ICD-10-CM

## 2022-06-01 DIAGNOSIS — M19.90 UNSPECIFIED OSTEOARTHRITIS, UNSPECIFIED SITE: ICD-10-CM

## 2022-06-01 DIAGNOSIS — R06.02 SHORTNESS OF BREATH: ICD-10-CM

## 2022-06-01 DIAGNOSIS — Z82.49 FAMILY HISTORY OF ISCHEMIC HEART DISEASE AND OTHER DISEASES OF THE CIRCULATORY SYSTEM: ICD-10-CM

## 2022-06-01 DIAGNOSIS — E06.3 AUTOIMMUNE THYROIDITIS: ICD-10-CM

## 2022-06-03 ENCOUNTER — APPOINTMENT (OUTPATIENT)
Dept: CARDIOLOGY | Facility: CLINIC | Age: 62
End: 2022-06-03
Payer: MEDICARE

## 2022-06-03 VITALS
HEIGHT: 64 IN | SYSTOLIC BLOOD PRESSURE: 134 MMHG | OXYGEN SATURATION: 95 % | DIASTOLIC BLOOD PRESSURE: 72 MMHG | TEMPERATURE: 97.3 F | HEART RATE: 66 BPM | BODY MASS INDEX: 50.02 KG/M2 | WEIGHT: 293 LBS

## 2022-06-03 PROCEDURE — 99214 OFFICE O/P EST MOD 30 MIN: CPT

## 2022-06-03 RX ORDER — ASPIRIN 81 MG/1
81 TABLET, CHEWABLE ORAL
Qty: 90 | Refills: 2 | Status: DISCONTINUED | COMMUNITY
End: 2022-06-03

## 2022-06-04 RX ORDER — DILTIAZEM HYDROCHLORIDE 240 MG/1
240 CAPSULE, EXTENDED RELEASE ORAL
Qty: 90 | Refills: 3 | Status: DISCONTINUED | COMMUNITY
Start: 1900-01-01 | End: 2022-06-04

## 2022-06-23 ENCOUNTER — APPOINTMENT (OUTPATIENT)
Dept: PAIN MANAGEMENT | Facility: CLINIC | Age: 62
End: 2022-06-23

## 2022-07-18 ENCOUNTER — APPOINTMENT (OUTPATIENT)
Dept: PAIN MANAGEMENT | Facility: CLINIC | Age: 62
End: 2022-07-18

## 2022-07-20 ENCOUNTER — APPOINTMENT (OUTPATIENT)
Dept: ORTHOPEDIC SURGERY | Facility: CLINIC | Age: 62
End: 2022-07-20

## 2022-08-02 ENCOUNTER — APPOINTMENT (OUTPATIENT)
Dept: PULMONOLOGY | Facility: CLINIC | Age: 62
End: 2022-08-02

## 2022-08-05 ENCOUNTER — APPOINTMENT (OUTPATIENT)
Dept: PULMONOLOGY | Facility: CLINIC | Age: 62
End: 2022-08-05

## 2022-08-05 ENCOUNTER — NON-APPOINTMENT (OUTPATIENT)
Age: 62
End: 2022-08-05

## 2022-08-05 VITALS
TEMPERATURE: 96.1 F | OXYGEN SATURATION: 97 % | DIASTOLIC BLOOD PRESSURE: 74 MMHG | WEIGHT: 293 LBS | SYSTOLIC BLOOD PRESSURE: 138 MMHG | HEART RATE: 94 BPM | HEIGHT: 64 IN | BODY MASS INDEX: 50.02 KG/M2

## 2022-08-05 PROCEDURE — 94010 BREATHING CAPACITY TEST: CPT

## 2022-08-05 PROCEDURE — 99214 OFFICE O/P EST MOD 30 MIN: CPT | Mod: 25

## 2022-08-05 PROCEDURE — ZZZZZ: CPT

## 2022-08-05 NOTE — HISTORY OF PRESENT ILLNESS
[Never] : never [TextBox_4] : 62 male hx asthma and  obstructive sleep apnea\par today feels good no sob no cough no wheeze o chest pain no  tightness\par full activity no limitation to  activities of daily living\par remains on symbicort\par used albuterol 3 x past 1 month\par using cpa nitely and feels good ?snore\par feels refreshed in am\par still can nap during day\par compliance report perfect compliance past month

## 2022-08-05 NOTE — DISCUSSION/SUMMARY
[FreeTextEntry1] : Ms. Ng has asthma and obstructive sleep apnea.  She is well controlled on the current medicine as she has minimal albuterol needs.  She is able to perform all of her activities of daily living.  We will continue this therapy.  Patient also has obstructive sleep apnea.  She is feeling good but does tend to nap during the day occasionally.  Review of her compliance report reveals excellent compliance with AHI less than 5.  We will continue current positive pressure breathing.  The patient understands and agrees with this plan of care.\par The patient understands and agrees with plan of care.\par Today's office visit encompassed 32 minutes. I conducted an extensive history ,physical exam and reviewed diagnosis and treatment options  including diagnostic tests,radiologic studies including  cat scans  and the use of prescription medication.

## 2022-08-05 NOTE — PHYSICAL EXAM
[No Acute Distress] : no acute distress [Normal Oropharynx] : normal oropharynx [Normal Appearance] : normal appearance [No Neck Mass] : no neck mass [Normal Rate/Rhythm] : normal rate/rhythm [Normal S1, S2] : normal s1, s2 [No Murmurs] : no murmurs [No Resp Distress] : no resp distress [Clear to Auscultation Bilaterally] : clear to auscultation bilaterally [No Abnormalities] : no abnormalities [Benign] : benign [Normal Gait] : normal gait [No Clubbing] : no clubbing [No Cyanosis] : no cyanosis [No Edema] : no edema [FROM] : FROM [Normal Color/ Pigmentation] : normal color/ pigmentation [No Focal Deficits] : no focal deficits [Oriented x3] : oriented x3 [Normal Affect] : normal affect [TextBox_2] : Obese female no acute respiratory distress

## 2022-09-29 NOTE — PHYSICAL EXAM
[Well Developed] : well developed [Well Nourished] : well nourished [No Acute Distress] : no acute distress [Obese] : obese [Normal Conjunctiva] : normal conjunctiva [Normal Venous Pressure] : normal venous pressure [No Carotid Bruit] : no carotid bruit [Normal S1, S2] : normal S1, S2 [No Murmur] : no murmur [No Rub] : no rub [No Gallop] : no gallop [Clear Lung Fields] : clear lung fields [Good Air Entry] : good air entry [No Respiratory Distress] : no respiratory distress  [Soft] : abdomen soft [Non Tender] : non-tender [No Masses/organomegaly] : no masses/organomegaly [Normal Bowel Sounds] : normal bowel sounds [Normal Gait] : normal gait [No Edema] : no edema [No Cyanosis] : no cyanosis [No Clubbing] : no clubbing [No Varicosities] : no varicosities [No Rash] : no rash [No Skin Lesions] : no skin lesions [Moves all extremities] : moves all extremities [No Focal Deficits] : no focal deficits [Normal Speech] : normal speech [Alert and Oriented] : alert and oriented [Normal memory] : normal memory [Normal Appearance] : normal appearance [FreeTextEntry1] : obesity [Eyelids - No Xanthelasma] : the eyelids demonstrated no xanthelasmas [No Oral Pallor] : no oral pallor [Normal Jugular Venous V Waves Present] : normal jugular venous V waves present [Respiration, Rhythm And Depth] : normal respiratory rhythm and effort [Heart Rate And Rhythm] : heart rate and rhythm were normal [Heart Sounds] : normal S1 and S2 [Bowel Sounds] : normal bowel sounds [Abdomen Soft] : soft [Abnormal Walk] : normal gait [Nail Clubbing] : no clubbing of the fingernails [Cyanosis, Localized] : no localized cyanosis [] : no rash [Oriented To Time, Place, And Person] : oriented to person, place, and time

## 2022-10-03 ENCOUNTER — APPOINTMENT (OUTPATIENT)
Dept: CARDIOLOGY | Facility: CLINIC | Age: 62
End: 2022-10-03

## 2022-10-19 ENCOUNTER — APPOINTMENT (OUTPATIENT)
Dept: CARDIOLOGY | Facility: CLINIC | Age: 62
End: 2022-10-19

## 2022-10-19 VITALS
HEART RATE: 87 BPM | SYSTOLIC BLOOD PRESSURE: 130 MMHG | DIASTOLIC BLOOD PRESSURE: 72 MMHG | WEIGHT: 293 LBS | BODY MASS INDEX: 50.02 KG/M2 | TEMPERATURE: 97.1 F | OXYGEN SATURATION: 97 % | HEIGHT: 64 IN

## 2022-10-19 PROCEDURE — 99215 OFFICE O/P EST HI 40 MIN: CPT

## 2022-10-19 RX ORDER — MELOXICAM 15 MG/1
15 TABLET ORAL
Qty: 30 | Refills: 0 | Status: COMPLETED | COMMUNITY
Start: 2022-05-25 | End: 2022-10-19

## 2022-10-19 RX ORDER — METHOCARBAMOL 750 MG/1
750 TABLET, FILM COATED ORAL
Qty: 30 | Refills: 1 | Status: COMPLETED | COMMUNITY
Start: 2022-05-25 | End: 2022-10-19

## 2022-11-02 ENCOUNTER — TRANSCRIPTION ENCOUNTER (OUTPATIENT)
Age: 62
End: 2022-11-02

## 2022-11-21 NOTE — PHYSICAL EXAM
[Well Developed] : well developed [Well Nourished] : well nourished [No Acute Distress] : no acute distress [Obese] : obese [Normal Conjunctiva] : normal conjunctiva [Normal Venous Pressure] : normal venous pressure [No Carotid Bruit] : no carotid bruit [Normal S1, S2] : normal S1, S2 [No Murmur] : no murmur [No Rub] : no rub [No Gallop] : no gallop [Clear Lung Fields] : clear lung fields [Good Air Entry] : good air entry [No Respiratory Distress] : no respiratory distress  [Soft] : abdomen soft [Non Tender] : non-tender [No Masses/organomegaly] : no masses/organomegaly [Normal Bowel Sounds] : normal bowel sounds [Normal Gait] : normal gait [No Edema] : no edema [No Cyanosis] : no cyanosis [No Clubbing] : no clubbing [No Varicosities] : no varicosities [No Rash] : no rash [No Skin Lesions] : no skin lesions [Moves all extremities] : moves all extremities [No Focal Deficits] : no focal deficits [Normal Speech] : normal speech [Alert and Oriented] : alert and oriented [Normal memory] : normal memory [Normal Appearance] : normal appearance [Eyelids - No Xanthelasma] : the eyelids demonstrated no xanthelasmas [No Oral Pallor] : no oral pallor [Normal Jugular Venous V Waves Present] : normal jugular venous V waves present [Respiration, Rhythm And Depth] : normal respiratory rhythm and effort [Heart Rate And Rhythm] : heart rate and rhythm were normal [Heart Sounds] : normal S1 and S2 [Bowel Sounds] : normal bowel sounds [Abdomen Soft] : soft [Abnormal Walk] : normal gait [Cyanosis, Localized] : no localized cyanosis [Nail Clubbing] : no clubbing of the fingernails [] : no rash [Oriented To Time, Place, And Person] : oriented to person, place, and time [FreeTextEntry1] : obesity

## 2022-11-21 NOTE — DISCUSSION/SUMMARY
[FreeTextEntry1] : Willy is a 62-year-old female with medical history detailed above and active medical issues including:\par \par - Patient has dyspnea with moderate exertion unchanged, no significant ischemia, nonobstructive cath May 2022,  abnormal  Myoview stress test Sept 2021\par \par - Recurrent palpitations ZioPatch with brief PSVT, rare PVCs on diltiazem , normal LVEF echo Feb 2021\par \par - Hyperlipidemia on atorvastatin well-tolerated\par \par - Obesity. Discussed calorie reduction and increased exercise as a weight reduction strategy.\par \par - Sleep apnea using CPAP\par \par - History of thyroid nodule followed by endocrinologist\par \par Advised patient to follow active lifestyle with regular cardiovascular exercise. Patient educated on lifestyle and diet modification with low sodium low fat diet and avoidance of excessive alcohol. Patient is aware to call with any symptoms or concerns. \par \par Patient will be seen in cardiology follow-up 6 months same day echocardiogram.  Current cardiac medications remain unchanged and renewals  are up to date. Repeat labs will be ordered with PMD.\par \par Willy will followup with Dr Jomar Teague for primary care\par \par

## 2023-01-30 RX ORDER — DILTIAZEM HYDROCHLORIDE 240 MG/1
240 CAPSULE, EXTENDED RELEASE ORAL
Qty: 90 | Refills: 1 | Status: ACTIVE | COMMUNITY
Start: 2020-10-09 | End: 1900-01-01

## 2023-01-30 RX ORDER — ATORVASTATIN CALCIUM 20 MG/1
20 TABLET, FILM COATED ORAL DAILY
Qty: 90 | Refills: 1 | Status: ACTIVE | COMMUNITY
Start: 1900-01-01 | End: 1900-01-01

## 2023-02-07 ENCOUNTER — NON-APPOINTMENT (OUTPATIENT)
Age: 63
End: 2023-02-07

## 2023-02-16 ENCOUNTER — APPOINTMENT (OUTPATIENT)
Dept: PULMONOLOGY | Facility: CLINIC | Age: 63
End: 2023-02-16

## 2023-04-12 PROBLEM — G47.30 SLEEP APNEA, UNSPECIFIED: Status: ACTIVE | Noted: 2017-10-31

## 2023-04-19 ENCOUNTER — APPOINTMENT (OUTPATIENT)
Dept: CARDIOLOGY | Facility: CLINIC | Age: 63
End: 2023-04-19
Payer: MEDICARE

## 2023-04-19 VITALS
SYSTOLIC BLOOD PRESSURE: 120 MMHG | BODY MASS INDEX: 50.02 KG/M2 | HEIGHT: 64 IN | DIASTOLIC BLOOD PRESSURE: 80 MMHG | WEIGHT: 293 LBS | HEART RATE: 72 BPM | OXYGEN SATURATION: 96 %

## 2023-04-19 DIAGNOSIS — G47.30 SLEEP APNEA, UNSPECIFIED: ICD-10-CM

## 2023-04-19 DIAGNOSIS — E03.9 HYPOTHYROIDISM, UNSPECIFIED: ICD-10-CM

## 2023-04-19 DIAGNOSIS — R06.02 SHORTNESS OF BREATH: ICD-10-CM

## 2023-04-19 PROCEDURE — 93306 TTE W/DOPPLER COMPLETE: CPT

## 2023-04-19 PROCEDURE — 76376 3D RENDER W/INTRP POSTPROCES: CPT

## 2023-04-19 RX ORDER — HYDROCHLOROTHIAZIDE 25 MG/1
25 TABLET ORAL DAILY
Refills: 0 | Status: ACTIVE | COMMUNITY

## 2023-04-19 RX ORDER — LEVOTHYROXINE SODIUM 175 UG/1
175 TABLET ORAL DAILY
Refills: 0 | Status: ACTIVE | COMMUNITY

## 2023-04-19 NOTE — PHYSICAL EXAM
[Well Developed] : well developed [Well Nourished] : well nourished [No Acute Distress] : no acute distress [Normal Conjunctiva] : normal conjunctiva [Obese] : obese [Normal Venous Pressure] : normal venous pressure [No Carotid Bruit] : no carotid bruit [Normal S1, S2] : normal S1, S2 [No Murmur] : no murmur [No Rub] : no rub [No Gallop] : no gallop [Clear Lung Fields] : clear lung fields [Good Air Entry] : good air entry [No Respiratory Distress] : no respiratory distress  [Soft] : abdomen soft [Non Tender] : non-tender [No Masses/organomegaly] : no masses/organomegaly [Normal Bowel Sounds] : normal bowel sounds [Normal Gait] : normal gait [No Edema] : no edema [No Cyanosis] : no cyanosis [No Clubbing] : no clubbing [No Varicosities] : no varicosities [No Rash] : no rash [No Skin Lesions] : no skin lesions [Moves all extremities] : moves all extremities [No Focal Deficits] : no focal deficits [Normal Speech] : normal speech [Alert and Oriented] : alert and oriented [Normal memory] : normal memory [Normal Appearance] : normal appearance [Eyelids - No Xanthelasma] : the eyelids demonstrated no xanthelasmas [No Oral Pallor] : no oral pallor [Normal Jugular Venous V Waves Present] : normal jugular venous V waves present [Respiration, Rhythm And Depth] : normal respiratory rhythm and effort [Heart Rate And Rhythm] : heart rate and rhythm were normal [Heart Sounds] : normal S1 and S2 [Bowel Sounds] : normal bowel sounds [Abdomen Soft] : soft [Abnormal Walk] : normal gait [Nail Clubbing] : no clubbing of the fingernails [Cyanosis, Localized] : no localized cyanosis [] : no rash [Oriented To Time, Place, And Person] : oriented to person, place, and time [FreeTextEntry1] : obesity

## 2023-04-19 NOTE — HISTORY OF PRESENT ILLNESS
[FreeTextEntry1] : Willy is a 62-year-old female with history of hypertension, hyperlipidemia, palpitations, obesity,  KEVIN using CPAP, thyroid nodule, MAZARIEGOS, atypical chest pain, nonobstructive cath May 2022 .\par \par F/u 04/19/23- Patient states she has been feeling well since her previous visit. Patient still with very mild dyspnea on exertion, but feels better knowing she had a C 05/22 with normal coronaries. \par Patient recently had her HCTZ increased to 25mg PO qday for HTN, now with BP log showing almost all readings less then 120/80. \par No further chest pain at this time. \par Repeat echocardiogram with normal EF , mild TR, PASP 24mmHg.\par \par Previous Workup:\par Echocardiogram 04/19/2 EF 60%, mild TR, PASP 24 mmHg. \par \par LHC 06/22: LM normal; LAD normal; LCx normal; RCA normal\par \par Lexiscan Myoview stress test Sept 2021 LVEF 69% with normal wall motion, small anterior apical defect in the presence of breast attenuation normal wall motion likely artifact, nonischemic EKG response, no chest pain, ventricular bigeminy with baseline sinus rhythm.\par \par Echocardiogram February 2021, LVEF 65%, normal Doppler, mild pHTN.\par \par Carotid Doppler February 2021, mild nonobstructive plaque.\par \par Zio patch 1 week heart monitor August 2021, sinus rhythm with average heart rate 82, brief SVT up to 193 bpm, rare PVCs\par \par Echocardiogram December 2019, LVEF 60 to 65%, mild MR and TR, mild pulmonary hypertension.\par \par Exercise Myoview stress test November 2018, LVEF 66%, normal perfusion, "cleft seen, no ischemia.\par \par \par

## 2023-04-19 NOTE — ASSESSMENT
[FreeTextEntry1] : Willy is a 62-year-old female with medical history detailed above and active medical issues including:\par \par Dyspnea On Exertion\par - Unchanged. \par - Echocardiogram today with normal EF, no RWMA, no significant valvuar abnormalities. \par - Patient with [facility] 06/22 with normal coronaries. \par - Hx of KEVIN and OHS, encouraged weight loss. \par \par Palpitations\par - Resolved at this time. \par - Continue home Diltiazem ER 240mg PO qday. \par - Las Zio Patch 02/21 with brief PSVT, rare PVCs. \par \par HTN\par - Previously uncontrolled per patient's PMD. \par - HCTZ increased to 25mg PO qday. \par - BP well controlled today and on home BP log. \par - Continue home Diltiazem 240mg PO qday, Telmisartan 80mg PO qday, and HCTZ 25mg PO qday. \par - Reviewed the importance of a low sodium diet and regular cardiovascular exercise.\par - Continue to monitor blood pressure at home. \par - Instructed to call if persistently elevated readings or adverse affects.\par \par HLD\par - Currently on lipitor 20mg PO qday. \par - Patient states she had bloodwork last month, will try to get results faxed over for our review. \par - Hyperlipidemia:\par - Low cholesterol diet reviewed.\par - Risk, benefits, and alternatives to statin therapy reviewed. ASCVD 10 year risk  %.\par - Follow-up fasting lipid panel in 6 months.\par \par KEVIN\par - Continue CPAP.\par \par Thyroid Nodule/Hypothyroidism\par - F/u with Endocrinologist. \par - History of thyroid nodule followed by endocrinologist\par \par Advised patient to follow active lifestyle with regular cardiovascular exercise. Patient educated on lifestyle and diet modification with low sodium low fat diet and avoidance of excessive alcohol. Patient is aware to call with any symptoms or concerns. \par \par Patient will be seen in cardiology follow-up 6 months.Current cardiac medications remain unchanged and renewals are up to date. Repeat labs will be ordered with PMD.\par \par Sincerely,\par \par Gabriela Guerrero PA-C\par Patients history, testing, and plan reviewed with supervising MD: Dr. Patrick Valentino\par \par Willy will followup with Dr Jomar Teague for primary care\par

## 2023-04-19 NOTE — ASSESSMENT
[FreeTextEntry1] : Willy is a 62-year-old female with medical history detailed above and active medical issues including:\par \par Dyspnea On Exertion\par - Unchanged. \par - Echocardiogram today with normal EF, no RWMA, no significant valvuar abnormalities. \par - Patient with [facility] 06/22 with normal coronaries. \par - Hx of KEVIN and OHS, encouraged weight loss. \par \par Palpitations\par - Resolved at this time. \par - Continue home Diltiazem ER 240mg PO qday. \par - Las Zio Patch 02/21 with brief PSVT, rare PVCs. \par \par HTN\par - Previously uncontrolled per patient's PMD. \par - HCTZ increased to 25mg PO qday. \par - BP well controlled today and on home BP log. \par - Continue home Diltiazem 240mg PO qday, Telmisartan 80mg PO qday, and HCTZ 25mg PO qday. \par - Reviewed the importance of a low sodium diet and regular cardiovascular exercise.\par - Continue to monitor blood pressure at home. \par - Instructed to call if persistently elevated readings or adverse affects.\par \par HLD\par - Currently on lipitor 20mg PO qday. \par - Patient states she had bloodwork last month, will try to get results faxed over for our review. \par - Hyperlipidemia:\par - Low cholesterol diet reviewed.\par - Risk, benefits, and alternatives to statin therapy reviewed. ASCVD 10 year risk  %.\par - Follow-up fasting lipid panel in 6 months.\par \par KEVIN\par - Continue CPAP.\par \par Thyroid Nodule/Hypothyroidism\par - F/u with Endocrinologist. \par - History of thyroid nodule followed by endocrinologist\par \par Advised patient to follow active lifestyle with regular cardiovascular exercise. Patient educated on lifestyle and diet modification with low sodium low fat diet and avoidance of excessive alcohol. Patient is aware to call with any symptoms or concerns. \par \par Patient will be seen in cardiology follow-up 6 months.Current cardiac medications remain unchanged and renewals are up to date. Repeat labs will be ordered with PMD.\par \par Sincerely,\par \par Gabriela Guerrero PA-C\par Patients history, testing, and plan reviewed with supervising MD: Dr. Patrick Valentino\par \par Willy will followup with Dr Jomar Teague for primary care\par

## 2023-04-22 ENCOUNTER — NON-APPOINTMENT (OUTPATIENT)
Age: 63
End: 2023-04-22

## 2023-05-08 ENCOUNTER — TRANSCRIPTION ENCOUNTER (OUTPATIENT)
Age: 63
End: 2023-05-08

## 2023-05-16 ENCOUNTER — APPOINTMENT (OUTPATIENT)
Dept: PULMONOLOGY | Facility: CLINIC | Age: 63
End: 2023-05-16
Payer: MEDICARE

## 2023-05-16 VITALS
HEART RATE: 76 BPM | SYSTOLIC BLOOD PRESSURE: 132 MMHG | DIASTOLIC BLOOD PRESSURE: 86 MMHG | BODY MASS INDEX: 50.02 KG/M2 | HEIGHT: 64 IN | OXYGEN SATURATION: 98 % | WEIGHT: 293 LBS | TEMPERATURE: 98.2 F

## 2023-05-16 PROCEDURE — 99214 OFFICE O/P EST MOD 30 MIN: CPT

## 2023-05-16 NOTE — HISTORY OF PRESENT ILLNESS
[Never] : never [Obstructive Sleep Apnea] : obstructive sleep apnea [CPAP:] : CPAP [Nasal pillow] : nasal pillow [TextBox_4] : 63  female hx asthma and  obstructive sleep apnea\par April 22 no fever coughing and +phlegm yellow green  and pharyngitis \par seen clinic and given steroids x 5 d and given augmentin by pcp\par 10 d later still dyspnea and prednisone 50 mg qd \par 6 d agio stopped symbicort and started breztri \par today feels improved  sl cough but improve no neb in 1 week\par using cpap each nite except when very ill\par off prednisone\par marilou at [facility] for obstructive sleep apnea apap 8-10 [TextBox_158] : Anurag Coles in [place]

## 2023-05-16 NOTE — PHYSICAL EXAM
[No Acute Distress] : no acute distress [Normal Oropharynx] : normal oropharynx [Normal Appearance] : normal appearance [No Neck Mass] : no neck mass [Normal Rate/Rhythm] : normal rate/rhythm [Normal S1, S2] : normal s1, s2 [No Murmurs] : no murmurs [No Resp Distress] : no resp distress [Clear to Auscultation Bilaterally] : clear to auscultation bilaterally [No Abnormalities] : no abnormalities [Benign] : benign [Normal Gait] : normal gait [No Clubbing] : no clubbing [No Cyanosis] : no cyanosis [No Edema] : no edema [FROM] : FROM [Normal Color/ Pigmentation] : normal color/ pigmentation [No Focal Deficits] : no focal deficits [Oriented x3] : oriented x3 [Normal Affect] : normal affect [TextBox_2] : Obese female no distress

## 2023-05-16 NOTE — REASON FOR VISIT
[Follow-Up] : a follow-up visit [Asthma] : asthma [Sleep Apnea] : sleep apnea [Cough] : cough [Shortness of Breath] : shortness of breath [TextBox_44] : Patient states she has been sick since 4/26/23.  She went to urgent care and was placed on prednisone.  The following week she went to Primary care and her prednisone was increased from 40 mg to 50 mg and Augmentin 875 mg.  Past week she was told to stop the Symbicort and start Breztri 150mcg twice a day for two weeks and then resume the Symbicort.  She states she has seen a difference.  Her cough and SOB has improved.  Patient uses a CPAP and is being followed by Dr Lepe.

## 2023-05-16 NOTE — DISCUSSION/SUMMARY
[FreeTextEntry1] : Ms. Ng had a recent exacerbation of her asthma.  The patient was treated appropriate antibiotics and steroids.  She currently is feeling well.  She is on the Breztri and have asked to complete the Breztri and then resume her Symbicort as it does not seem to be a need for maintenance therapy with a third medicine i.e. long-acting muscarinic antagonist.  She is followed at [facility] for sleep apnea and adjustments have been made to her BiPAP.\par The patient understands and agrees with plan of care.\par Today's office visit encompassed 32 minutes. I conducted an extensive history ,physical exam and reviewed diagnosis and treatment options  including diagnostic tests,radiologic studies including  cat scans  and the use of prescription medication.

## 2023-06-02 ENCOUNTER — OFFICE (OUTPATIENT)
Facility: LOCATION | Age: 63
Setting detail: OPHTHALMOLOGY
End: 2023-06-02
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
## 2023-06-02 PROCEDURE — NO SHOW FE NO SHOW FEE: Performed by: OPHTHALMOLOGY

## 2023-10-11 PROBLEM — E78.5 HYPERLIPIDEMIA: Status: ACTIVE | Noted: 2018-02-23

## 2023-10-11 PROBLEM — G47.33 OBSTRUCTIVE SLEEP APNEA (ADULT) (PEDIATRIC): Status: ACTIVE | Noted: 2022-08-05

## 2023-10-11 PROBLEM — R00.2 HEART PALPITATIONS: Status: ACTIVE | Noted: 2021-08-23

## 2023-10-11 PROBLEM — I10 HYPERTENSION: Status: ACTIVE | Noted: 2018-02-23

## 2023-10-11 PROBLEM — R07.89 ATYPICAL CHEST PAIN: Status: ACTIVE | Noted: 2018-10-11

## 2023-10-11 PROBLEM — R00.2 PALPITATION: Status: ACTIVE | Noted: 2018-10-11

## 2023-10-18 ENCOUNTER — APPOINTMENT (OUTPATIENT)
Dept: CARDIOLOGY | Facility: CLINIC | Age: 63
End: 2023-10-18

## 2023-10-18 DIAGNOSIS — I10 ESSENTIAL (PRIMARY) HYPERTENSION: ICD-10-CM

## 2023-10-18 DIAGNOSIS — R00.2 PALPITATIONS: ICD-10-CM

## 2023-10-18 DIAGNOSIS — E78.5 HYPERLIPIDEMIA, UNSPECIFIED: ICD-10-CM

## 2023-10-18 DIAGNOSIS — G47.33 OBSTRUCTIVE SLEEP APNEA (ADULT) (PEDIATRIC): ICD-10-CM

## 2023-10-18 DIAGNOSIS — R07.89 OTHER CHEST PAIN: ICD-10-CM

## 2023-11-13 ENCOUNTER — APPOINTMENT (OUTPATIENT)
Dept: PULMONOLOGY | Facility: CLINIC | Age: 63
End: 2023-11-13
Payer: MEDICARE

## 2023-11-13 VITALS
OXYGEN SATURATION: 97 % | HEART RATE: 79 BPM | BODY MASS INDEX: 50.02 KG/M2 | HEIGHT: 64 IN | SYSTOLIC BLOOD PRESSURE: 136 MMHG | WEIGHT: 293 LBS | DIASTOLIC BLOOD PRESSURE: 86 MMHG | TEMPERATURE: 96.5 F

## 2023-11-13 PROCEDURE — 99214 OFFICE O/P EST MOD 30 MIN: CPT

## 2023-11-17 ENCOUNTER — NON-APPOINTMENT (OUTPATIENT)
Age: 63
End: 2023-11-17

## 2023-11-28 ENCOUNTER — APPOINTMENT (OUTPATIENT)
Dept: PULMONOLOGY | Facility: CLINIC | Age: 63
End: 2023-11-28
Payer: MEDICARE

## 2023-11-28 VITALS
TEMPERATURE: 97.2 F | SYSTOLIC BLOOD PRESSURE: 132 MMHG | OXYGEN SATURATION: 97 % | DIASTOLIC BLOOD PRESSURE: 78 MMHG | HEART RATE: 74 BPM

## 2023-11-28 VITALS — BODY MASS INDEX: 50.02 KG/M2 | WEIGHT: 293 LBS | HEIGHT: 64 IN

## 2023-11-28 DIAGNOSIS — R05.3 CHRONIC COUGH: ICD-10-CM

## 2023-11-28 DIAGNOSIS — J01.90 ACUTE SINUSITIS, UNSPECIFIED: ICD-10-CM

## 2023-11-28 DIAGNOSIS — B97.89 ACUTE SINUSITIS, UNSPECIFIED: ICD-10-CM

## 2023-11-28 PROCEDURE — 99214 OFFICE O/P EST MOD 30 MIN: CPT

## 2023-11-28 RX ORDER — APIXABAN 5 MG/1
5 TABLET, FILM COATED ORAL
Refills: 0 | Status: ACTIVE | COMMUNITY

## 2023-11-28 RX ORDER — ALBUTEROL SULFATE 2.5 MG/.5ML
SOLUTION RESPIRATORY (INHALATION)
Refills: 0 | Status: ACTIVE | COMMUNITY

## 2023-11-28 RX ORDER — PREDNISONE 10 MG/1
10 TABLET ORAL DAILY
Qty: 28 | Refills: 1 | Status: ACTIVE | COMMUNITY
Start: 2023-11-28 | End: 1900-01-01

## 2023-11-28 RX ORDER — LORATADINE AND PSEUDOEPHEDRINE SULFATE 10; 240 MG/1; MG/1
10-240 TABLET, FILM COATED, EXTENDED RELEASE ORAL DAILY
Qty: 30 | Refills: 0 | Status: ACTIVE | COMMUNITY
Start: 2023-11-28 | End: 1900-01-01

## 2023-12-01 ENCOUNTER — OFFICE (OUTPATIENT)
Facility: LOCATION | Age: 63
Setting detail: OPHTHALMOLOGY
End: 2023-12-01
Payer: MEDICARE

## 2023-12-01 ENCOUNTER — RX ONLY (RX ONLY)
Age: 63
End: 2023-12-01

## 2023-12-01 ENCOUNTER — OFFICE (OUTPATIENT)
Facility: LOCATION | Age: 63
Setting detail: OPHTHALMOLOGY
End: 2023-12-01

## 2023-12-01 DIAGNOSIS — H43.393: ICD-10-CM

## 2023-12-01 DIAGNOSIS — H35.033: ICD-10-CM

## 2023-12-01 DIAGNOSIS — H01.002: ICD-10-CM

## 2023-12-01 DIAGNOSIS — H43.813: ICD-10-CM

## 2023-12-01 DIAGNOSIS — Z13.5: ICD-10-CM

## 2023-12-01 DIAGNOSIS — H01.001: ICD-10-CM

## 2023-12-01 DIAGNOSIS — H01.005: ICD-10-CM

## 2023-12-01 DIAGNOSIS — E11.9: ICD-10-CM

## 2023-12-01 DIAGNOSIS — H52.4: ICD-10-CM

## 2023-12-01 DIAGNOSIS — H25.13: ICD-10-CM

## 2023-12-01 DIAGNOSIS — H16.223: ICD-10-CM

## 2023-12-01 DIAGNOSIS — H01.004: ICD-10-CM

## 2023-12-01 DIAGNOSIS — H52.13: ICD-10-CM

## 2023-12-01 PROBLEM — H35.443 AGE-RELATED RETICULAR DEGENERATION OF RETINA; BOTH EYES: Status: ACTIVE | Noted: 2023-12-01

## 2023-12-01 PROBLEM — H02.835 DERMATOCHALASIS; RIGHT UPPER LID, RIGHT LOWER LID, LEFT UPPER LID, LEFT LOWER LID: Status: ACTIVE | Noted: 2023-12-01

## 2023-12-01 PROBLEM — H02.832 DERMATOCHALASIS; RIGHT UPPER LID, RIGHT LOWER LID, LEFT UPPER LID, LEFT LOWER LID: Status: ACTIVE | Noted: 2023-12-01

## 2023-12-01 PROBLEM — H02.831 DERMATOCHALASIS; RIGHT UPPER LID, RIGHT LOWER LID, LEFT UPPER LID, LEFT LOWER LID: Status: ACTIVE | Noted: 2023-12-01

## 2023-12-01 PROBLEM — H10.433 CONJUNCTIVITIS CHRONIC FOLLICULAR; BOTH EYES: Status: ACTIVE | Noted: 2023-12-01

## 2023-12-01 PROBLEM — H02.834 DERMATOCHALASIS; RIGHT UPPER LID, RIGHT LOWER LID, LEFT UPPER LID, LEFT LOWER LID: Status: ACTIVE | Noted: 2023-12-01

## 2023-12-01 PROCEDURE — 92014 COMPRE OPH EXAM EST PT 1/>: CPT | Performed by: OPHTHALMOLOGY

## 2023-12-01 PROCEDURE — 92015 DETERMINE REFRACTIVE STATE: CPT | Mod: GA | Performed by: OPHTHALMOLOGY

## 2023-12-01 PROCEDURE — 92015 DETERMINE REFRACTIVE STATE: CPT | Performed by: OPHTHALMOLOGY

## 2023-12-01 PROCEDURE — 92250 FUNDUS PHOTOGRAPHY W/I&R: CPT | Performed by: OPHTHALMOLOGY

## 2023-12-01 ASSESSMENT — LID EXAM ASSESSMENTS
OD_BLEPHARITIS: RLL RUL T
OS_BLEPHARITIS: LLL LUL T

## 2023-12-01 ASSESSMENT — LID POSITION - DERMATOCHALASIS
OD_DERMATOCHALASIS: RLL RUL T
OS_DERMATOCHALASIS: LLL LUL T

## 2023-12-01 ASSESSMENT — CONFRONTATIONAL VISUAL FIELD TEST (CVF)
OD_FINDINGS: FULL
OS_FINDINGS: FULL

## 2023-12-01 ASSESSMENT — SUPERFICIAL PUNCTATE KERATITIS (SPK)
OD_SPK: T 1+
OS_SPK: T 1+

## 2023-12-04 ENCOUNTER — APPOINTMENT (OUTPATIENT)
Dept: PULMONOLOGY | Facility: CLINIC | Age: 63
End: 2023-12-04
Payer: MEDICARE

## 2023-12-04 VITALS
HEART RATE: 75 BPM | TEMPERATURE: 97.1 F | OXYGEN SATURATION: 96 % | DIASTOLIC BLOOD PRESSURE: 82 MMHG | SYSTOLIC BLOOD PRESSURE: 130 MMHG | BODY MASS INDEX: 49.51 KG/M2 | WEIGHT: 290 LBS | HEIGHT: 64 IN

## 2023-12-04 DIAGNOSIS — J45.40 MODERATE PERSISTENT ASTHMA, UNCOMPLICATED: ICD-10-CM

## 2023-12-04 PROCEDURE — 99214 OFFICE O/P EST MOD 30 MIN: CPT | Mod: 25

## 2023-12-04 PROCEDURE — 94727 GAS DIL/WSHOT DETER LNG VOL: CPT

## 2023-12-04 PROCEDURE — 94010 BREATHING CAPACITY TEST: CPT

## 2023-12-04 PROCEDURE — 94729 DIFFUSING CAPACITY: CPT

## 2023-12-04 RX ORDER — PREDNISONE 5 MG/1
5 TABLET ORAL
Qty: 72 | Refills: 1 | Status: DISCONTINUED | COMMUNITY
Start: 2023-11-13 | End: 2023-12-04

## 2023-12-04 RX ORDER — LACTOBACILLUS RHAMNOSUS GG 10B CELL
CAPSULE ORAL
Refills: 0 | Status: DISCONTINUED | COMMUNITY
End: 2023-12-04

## 2023-12-04 RX ORDER — LEVOFLOXACIN 500 MG/1
500 TABLET, FILM COATED ORAL DAILY
Qty: 14 | Refills: 1 | Status: DISCONTINUED | COMMUNITY
Start: 2023-11-28 | End: 2023-12-04

## 2023-12-04 ASSESSMENT — REFRACTION_MANIFEST
OU_VA: 20/20
OD_ADD: +2.50
OD_SPHERE: -2.50
OD_AXIS: 170
OS_VA1: 20/20-2
OS_ADD: +2.50
OS_AXIS: 30
OS_CYLINDER: +0.75
OD_CYLINDER: +2.50
OS_SPHERE: -1.50
OD_VA1: 20/25

## 2023-12-04 ASSESSMENT — REFRACTION_CURRENTRX
OD_SPHERE: -2.25
OS_CYLINDER: +0.50
OD_VPRISM_DIRECTION: SV
OS_SPHERE: -1.50
OS_OVR_VA: 20/
OD_OVR_VA: 20/
OS_AXIS: 36
OD_CYLINDER: +2.00
OD_AXIS: 174
OS_VPRISM_DIRECTION: SV

## 2023-12-04 ASSESSMENT — SPHEQUIV_DERIVED
OS_SPHEQUIV: -0.875
OD_SPHEQUIV: -0.875
OD_SPHEQUIV: -1.25
OS_SPHEQUIV: -1.125

## 2023-12-04 ASSESSMENT — REFRACTION_AUTOREFRACTION
OD_CYLINDER: +2.75
OD_SPHERE: -2.25
OS_CYLINDER: +0.75
OS_AXIS: 32
OS_SPHERE: -1.25
OD_AXIS: 172

## 2024-06-03 ENCOUNTER — APPOINTMENT (OUTPATIENT)
Dept: PULMONOLOGY | Facility: CLINIC | Age: 64
End: 2024-06-03
Payer: MEDICARE

## 2024-06-03 VITALS
HEART RATE: 80 BPM | OXYGEN SATURATION: 98 % | HEIGHT: 64 IN | DIASTOLIC BLOOD PRESSURE: 82 MMHG | BODY MASS INDEX: 50.02 KG/M2 | SYSTOLIC BLOOD PRESSURE: 140 MMHG | WEIGHT: 293 LBS | TEMPERATURE: 96.5 F

## 2024-06-03 DIAGNOSIS — J01.90 ACUTE SINUSITIS, UNSPECIFIED: ICD-10-CM

## 2024-06-03 DIAGNOSIS — J45.909 UNSPECIFIED ASTHMA, UNCOMPLICATED: ICD-10-CM

## 2024-06-03 PROCEDURE — 99214 OFFICE O/P EST MOD 30 MIN: CPT

## 2024-06-03 RX ORDER — BENZONATATE 200 MG/1
200 CAPSULE ORAL
Qty: 90 | Refills: 3 | Status: DISCONTINUED | COMMUNITY
Start: 2023-11-28 | End: 2024-06-03

## 2024-06-03 RX ORDER — FAMOTIDINE 40 MG/1
40 TABLET, FILM COATED ORAL DAILY
Refills: 0 | Status: DISCONTINUED | COMMUNITY
End: 2024-06-03

## 2024-06-03 RX ORDER — ALBUTEROL SULFATE 2.5 MG/3ML
(2.5 MG/3ML) SOLUTION RESPIRATORY (INHALATION)
Refills: 0 | Status: ACTIVE | COMMUNITY

## 2024-06-03 RX ORDER — DOXYCYCLINE HYCLATE 100 MG/1
100 TABLET ORAL
Refills: 0 | Status: ACTIVE | COMMUNITY

## 2024-06-03 NOTE — HISTORY OF PRESENT ILLNESS
[Never] : never [Obstructive Sleep Apnea] : obstructive sleep apnea [CPAP:] : CPAP [Nasal pillow] : nasal pillow [TextBox_4] : 64 female hx mod asthma and ch sinusitis recent sinus surgery  polyps removed and ballon plasty since then feels sl improved today sl wheeze co 3 weeks sinus infection and bronchitis no chest pain no tightness  no fever  ++mucous yellow  on doxycycline and prednisone 50 mg x 7 d by pcp using albuterol tid  [TextBox_158] : Resmed [TextBox_165] : Patient being treated by Dr Gilbert at [facility].

## 2024-06-03 NOTE — PHYSICAL EXAM
[No Acute Distress] : no acute distress [Normal Oropharynx] : normal oropharynx [Normal Appearance] : normal appearance [No Neck Mass] : no neck mass [Normal Rate/Rhythm] : normal rate/rhythm [Normal S1, S2] : normal s1, s2 [No Murmurs] : no murmurs [No Resp Distress] : no resp distress [Clear to Auscultation Bilaterally] : clear to auscultation bilaterally [No Abnormalities] : no abnormalities [Benign] : benign [Normal Gait] : normal gait [No Clubbing] : no clubbing [No Cyanosis] : no cyanosis [No Edema] : no edema [FROM] : FROM [Normal Color/ Pigmentation] : normal color/ pigmentation [No Focal Deficits] : no focal deficits [Oriented x3] : oriented x3 [Normal Affect] : normal affect [TextBox_2] : Obese female coughing [TextBox_11] : Sinus tenderness

## 2024-06-03 NOTE — DISCUSSION/SUMMARY
[FreeTextEntry1] : Ms. Ng has chronic cough with chronic bronchitis and sinus tenderness.  She has had multiple sinus interventions and multiple infections.  Her lungs are clear and her pulmonary function test did not show any obstructive disease.  I think her cough is all based on chronic sinus disease.  She is being treated aggressively by her primary care physician  And I recommend follow-up with ENT if she does not improve.  Will continue all pulmonary therapy at this time. The patient understands and agrees with plan of care. Today's office visit encompassed 32 minutes. I conducted an extensive history, physical exam and reviewed diagnosis and treatment options including diagnostic tests,radiology studies including cat scans and the use of prescription medication.

## 2024-06-03 NOTE — REASON FOR VISIT
[Follow-Up] : a follow-up visit [Chest Pain] : chest pain [Asthma] : asthma [Sleep Apnea] : sleep apnea [Cough] : cough [Shortness of Breath] : shortness of breath [TextEntry] : Patient states for the past 3 weeks she's had bronchitis.  She is was treated with Augmentin but it was not affective. Medication was changed to Doxycycline and prednisone and she has been using her nebulizer every day.  She has been having asthma attacks more than usual.  She does not feel better.  She does have SOB, coughing, wheezing, chest tightness, headaches.  It has been difficult to take a deep breath.  Patient needs refills.

## 2024-06-04 ENCOUNTER — TRANSCRIPTION ENCOUNTER (OUTPATIENT)
Age: 64
End: 2024-06-04

## 2024-06-04 RX ORDER — BUDESONIDE AND FORMOTEROL FUMARATE DIHYDRATE 80; 4.5 UG/1; UG/1
80-4.5 AEROSOL RESPIRATORY (INHALATION) TWICE DAILY
Qty: 3 | Refills: 3 | Status: ACTIVE | COMMUNITY
Start: 2022-01-26 | End: 1900-01-01

## 2024-07-23 ENCOUNTER — OFFICE (OUTPATIENT)
Facility: LOCATION | Age: 64
Setting detail: OPHTHALMOLOGY
End: 2024-07-23
Payer: MEDICARE

## 2024-07-23 ENCOUNTER — RX ONLY (RX ONLY)
Age: 64
End: 2024-07-23

## 2024-07-23 DIAGNOSIS — E11.9: ICD-10-CM

## 2024-07-23 DIAGNOSIS — H25.13: ICD-10-CM

## 2024-07-23 DIAGNOSIS — Z13.5: ICD-10-CM

## 2024-07-23 DIAGNOSIS — H16.223: ICD-10-CM

## 2024-07-23 PROCEDURE — 92014 COMPRE OPH EXAM EST PT 1/>: CPT | Performed by: OPHTHALMOLOGY

## 2024-07-23 PROCEDURE — 92250 FUNDUS PHOTOGRAPHY W/I&R: CPT | Mod: GY | Performed by: OPHTHALMOLOGY

## 2024-07-23 ASSESSMENT — LID POSITION - DERMATOCHALASIS
OS_DERMATOCHALASIS: LLL LUL T
OD_DERMATOCHALASIS: RLL RUL T

## 2024-07-23 ASSESSMENT — LID EXAM ASSESSMENTS
OS_BLEPHARITIS: LLL LUL T
OD_BLEPHARITIS: RLL RUL T

## 2024-07-23 ASSESSMENT — CONFRONTATIONAL VISUAL FIELD TEST (CVF)
OD_FINDINGS: FULL
OS_FINDINGS: FULL

## 2024-08-16 ENCOUNTER — APPOINTMENT (OUTPATIENT)
Dept: ORTHOPEDIC SURGERY | Facility: CLINIC | Age: 64
End: 2024-08-16
Payer: MEDICARE

## 2024-08-16 DIAGNOSIS — M48.061 SPINAL STENOSIS, LUMBAR REGION WITHOUT NEUROGENIC CLAUDICATION: ICD-10-CM

## 2024-08-16 PROCEDURE — 99214 OFFICE O/P EST MOD 30 MIN: CPT

## 2024-08-16 RX ORDER — METHYLPREDNISOLONE 4 MG/1
4 TABLET ORAL
Qty: 1 | Refills: 0 | Status: ACTIVE | COMMUNITY
Start: 2024-08-16 | End: 1900-01-01

## 2024-08-18 NOTE — DATA REVIEWED
[MRI] : MRI [Lumbar Spine] : lumbar spine [I reviewed the films/CD and additionally noted] : I reviewed the films/CD and additionally noted [CT Scan] : CT scan [Report was reviewed and noted in the chart] : The report was reviewed and noted in the chart [I independently reviewed and interpreted images and report] : I independently reviewed and interpreted images and report [FreeTextEntry2] : Abdomen and pelvis CT scan 05/24/2021. DDD at L2-3 and L3-4. Spinal stenosis at L2-3. [FreeTextEntry1] : I stop paperwork reviewed

## 2024-08-18 NOTE — HISTORY OF PRESENT ILLNESS
[de-identified] : 08/16/2024 - Patient presenting with complaints of low back pain and radiating left > right leg pain. Last seen 2022. Has nemesio chronic on and off but worse over the last 2 weeks. Pain worse standing and walking. Strength in legs is normal. Taking Eliquis limited w NSAIDs, uses Tylenol with little bit of relief. Last mri is from 2019. States no changes to general medical health.   Date of Injury/Onset: 2 weeks ago Pain:    At Rest: 4/10  With Activity:  9/10  Mechanism of injury: NKI Quality of symptoms: sharp, achy,  Improves with: nothing Worse with: standing, walking Treatment/Imaging/Studies Since Last Visit: no new imaging Reports Available For Review Today: no Change since last visit:  Additional Information: None  ------------------- 05/25/2022 - Patient presents for initial encounter with lower back pain that intermittently radiates into lower extremities. Patient states she has history of lower back pain. No history of trauma. Patient states her pain is worse with extended periods of standing and walking. No changes to lower extremity strength b/l. No numbness/tingling sensation to lower extremities b/l. Treatment with OTC NSAIDs provides temporary relief. Treatment with formal physical therapy makes her symptoms worse. Patient has continued with at home exercises/stretches as best tolerated.

## 2024-08-18 NOTE — HISTORY OF PRESENT ILLNESS
[de-identified] : 08/16/2024 - Patient presenting with complaints of low back pain and radiating left > right leg pain. Last seen 2022. Has nemesio chronic on and off but worse over the last 2 weeks. Pain worse standing and walking. Strength in legs is normal. Taking Eliquis limited w NSAIDs, uses Tylenol with little bit of relief. Last mri is from 2019. States no changes to general medical health.   Date of Injury/Onset: 2 weeks ago Pain:    At Rest: 4/10  With Activity:  9/10  Mechanism of injury: NKI Quality of symptoms: sharp, achy,  Improves with: nothing Worse with: standing, walking Treatment/Imaging/Studies Since Last Visit: no new imaging Reports Available For Review Today: no Change since last visit:  Additional Information: None  ------------------- 05/25/2022 - Patient presents for initial encounter with lower back pain that intermittently radiates into lower extremities. Patient states she has history of lower back pain. No history of trauma. Patient states her pain is worse with extended periods of standing and walking. No changes to lower extremity strength b/l. No numbness/tingling sensation to lower extremities b/l. Treatment with OTC NSAIDs provides temporary relief. Treatment with formal physical therapy makes her symptoms worse. Patient has continued with at home exercises/stretches as best tolerated.

## 2024-08-18 NOTE — DISCUSSION/SUMMARY
[Medication Risks Reviewed] : Medication risks reviewed [de-identified] : Patient was provided with a referral for lumbar and lower extremity physical therapy to work on stretching, strengthening and range of motion.  I am prescribing the patient MDP for pain relief. Titration schedule provided. Continue Tylenol PRN pain management. Discussed limitation of treatment with nsaids due to current treatment regiment with blood thinners.  Updated MRI and/or injections if symptoms do not improve from conservative treatment. Last mri is from 2019 F/u 3-4 weeks.  Prior to appointment and during encounter with patient extensive medical records were reviewed including but not limited to, hospital records, outpatient records, imaging results, and lab data. During this appointment the patient was examined, diagnoses were discussed and explained in a face to face manner. In addition extensive time was spent reviewing aforementioned diagnostic studies. Counseling including abnormal image results, differential diagnoses, treatment options, risk and benefits, lifestyle changes, current condition, and current medications was performed. Patient's comments, questions, and concerns were addressed and patient verbalized understanding. Based on this patient's presentation at our office, which is an orthopedic spine surgeon's office, this patient inherently / intrinsically has a risk, however minute, of developing issues such as Cauda equina syndrome, bowel and bladder changes, or progression of motor or neurological deficits such as paralysis which may be permanent.  TORRI DAMON Acting as a Scribe for Dr. Jah SCOTT, Torri Damon, attest that this documentation has been prepared under the direction and in the presence of Provider Dain Tyson MD.

## 2024-08-18 NOTE — DISCUSSION/SUMMARY
[Medication Risks Reviewed] : Medication risks reviewed [de-identified] : Patient was provided with a referral for lumbar and lower extremity physical therapy to work on stretching, strengthening and range of motion.  I am prescribing the patient MDP for pain relief. Titration schedule provided. Continue Tylenol PRN pain management. Discussed limitation of treatment with nsaids due to current treatment regiment with blood thinners.  Updated MRI and/or injections if symptoms do not improve from conservative treatment. Last mri is from 2019 F/u 3-4 weeks.  Prior to appointment and during encounter with patient extensive medical records were reviewed including but not limited to, hospital records, outpatient records, imaging results, and lab data. During this appointment the patient was examined, diagnoses were discussed and explained in a face to face manner. In addition extensive time was spent reviewing aforementioned diagnostic studies. Counseling including abnormal image results, differential diagnoses, treatment options, risk and benefits, lifestyle changes, current condition, and current medications was performed. Patient's comments, questions, and concerns were addressed and patient verbalized understanding. Based on this patient's presentation at our office, which is an orthopedic spine surgeon's office, this patient inherently / intrinsically has a risk, however minute, of developing issues such as Cauda equina syndrome, bowel and bladder changes, or progression of motor or neurological deficits such as paralysis which may be permanent.  TORRI DAMON Acting as a Scribe for Dr. Jah SCOTT, Torri Damon, attest that this documentation has been prepared under the direction and in the presence of Provider Dain Tyson MD.

## 2024-08-18 NOTE — PHYSICAL EXAM
[Normal Coordination] : normal coordination [Normal DTR UE/LE] : normal DTR UE/LE  [Normal Sensation] : normal sensation [Normal Mood and Affect] : normal mood and affect [Oriented] : oriented [Able to Communicate] : able to communicate [Normal Skin] : normal skin [No Rash] : no rash [No Ulcers] : no ulcers [No Lesions] : no lesions [No obvious lymphadenopathy in areas examined] : no obvious lymphadenopathy in areas examined [Well Developed] : well developed [Well Nourished] : well nourished [Peripheral vascular exam is grossly normal] : peripheral vascular exam is grossly normal [No Respiratory Distress] : no respiratory distress [Lungs clear to auscultation bilaterally] : lungs clear to auscultation bilaterally [Flexion] : flexion [Extension] : extension [5___] : right extensor hallicus longus 5[unfilled]/5 [] : non-antalgic

## 2024-08-24 ENCOUNTER — NON-APPOINTMENT (OUTPATIENT)
Age: 64
End: 2024-08-24

## 2024-09-11 ENCOUNTER — APPOINTMENT (OUTPATIENT)
Dept: ORTHOPEDIC SURGERY | Facility: CLINIC | Age: 64
End: 2024-09-11

## 2024-10-07 ENCOUNTER — APPOINTMENT (OUTPATIENT)
Dept: PULMONOLOGY | Facility: CLINIC | Age: 64
End: 2024-10-07
Payer: MEDICARE

## 2024-10-07 VITALS
HEIGHT: 64 IN | DIASTOLIC BLOOD PRESSURE: 76 MMHG | WEIGHT: 293 LBS | OXYGEN SATURATION: 98 % | BODY MASS INDEX: 50.02 KG/M2 | HEART RATE: 76 BPM | SYSTOLIC BLOOD PRESSURE: 122 MMHG | TEMPERATURE: 97.6 F

## 2024-10-07 DIAGNOSIS — J45.40 MODERATE PERSISTENT ASTHMA, UNCOMPLICATED: ICD-10-CM

## 2024-10-07 DIAGNOSIS — U07.1 COVID-19: ICD-10-CM

## 2024-10-07 DIAGNOSIS — J45.909 UNSPECIFIED ASTHMA, UNCOMPLICATED: ICD-10-CM

## 2024-10-07 PROCEDURE — 99215 OFFICE O/P EST HI 40 MIN: CPT

## 2024-10-07 RX ORDER — ALBUTEROL SULFATE 2.5 MG/.5ML
SOLUTION RESPIRATORY (INHALATION)
Refills: 0 | Status: ACTIVE | COMMUNITY

## 2024-10-09 ENCOUNTER — APPOINTMENT (OUTPATIENT)
Dept: ORTHOPEDIC SURGERY | Facility: CLINIC | Age: 64
End: 2024-10-09
Payer: MEDICARE

## 2024-10-09 VITALS — HEIGHT: 64 IN | BODY MASS INDEX: 50.02 KG/M2 | WEIGHT: 293 LBS

## 2024-10-09 DIAGNOSIS — M48.061 SPINAL STENOSIS, LUMBAR REGION WITHOUT NEUROGENIC CLAUDICATION: ICD-10-CM

## 2024-10-09 PROCEDURE — 99214 OFFICE O/P EST MOD 30 MIN: CPT

## 2024-10-09 RX ORDER — METHOCARBAMOL 750 MG/1
750 TABLET, FILM COATED ORAL
Qty: 30 | Refills: 1 | Status: ACTIVE | COMMUNITY
Start: 2024-10-09 | End: 1900-01-01

## 2024-10-15 ENCOUNTER — RESULT REVIEW (OUTPATIENT)
Age: 64
End: 2024-10-15

## 2024-10-18 ENCOUNTER — OFFICE (OUTPATIENT)
Facility: LOCATION | Age: 64
Setting detail: OPHTHALMOLOGY
End: 2024-10-18

## 2024-10-18 DIAGNOSIS — H52.13: ICD-10-CM

## 2024-10-18 PROCEDURE — 92015 DETERMINE REFRACTIVE STATE: CPT | Mod: GA | Performed by: OPTOMETRIST

## 2025-01-05 ENCOUNTER — NON-APPOINTMENT (OUTPATIENT)
Age: 65
End: 2025-01-05

## 2025-01-27 ENCOUNTER — OFFICE (OUTPATIENT)
Facility: LOCATION | Age: 65
Setting detail: OPHTHALMOLOGY
End: 2025-01-27
Payer: MEDICARE

## 2025-01-27 DIAGNOSIS — Z13.5: ICD-10-CM

## 2025-01-27 DIAGNOSIS — H35.033: ICD-10-CM

## 2025-01-27 DIAGNOSIS — E11.9: ICD-10-CM

## 2025-01-27 DIAGNOSIS — H16.223: ICD-10-CM

## 2025-01-27 DIAGNOSIS — H25.13: ICD-10-CM

## 2025-01-27 PROCEDURE — 92250 FUNDUS PHOTOGRAPHY W/I&R: CPT | Performed by: OPHTHALMOLOGY

## 2025-01-27 PROCEDURE — 92014 COMPRE OPH EXAM EST PT 1/>: CPT | Performed by: OPHTHALMOLOGY

## 2025-01-27 ASSESSMENT — LID POSITION - DERMATOCHALASIS
OS_DERMATOCHALASIS: LLL LUL T
OD_DERMATOCHALASIS: RLL RUL T

## 2025-01-27 ASSESSMENT — CONFRONTATIONAL VISUAL FIELD TEST (CVF)
OS_FINDINGS: FULL
OD_FINDINGS: FULL

## 2025-01-27 ASSESSMENT — LID EXAM ASSESSMENTS
OS_BLEPHARITIS: LLL LUL T
OD_BLEPHARITIS: RLL RUL T

## 2025-01-27 ASSESSMENT — SUPERFICIAL PUNCTATE KERATITIS (SPK)
OS_SPK: T 1+
OD_SPK: T 1+

## 2025-01-28 ASSESSMENT — REFRACTION_MANIFEST
OU_VA: 20/20
OD_VA1: 20/25
OD_SPHERE: -2.00
OS_AXIS: 30
OD_VA1: 20/20-1
OS_VA1: 20/20-1
OD_SPHERE: -2.50
OS_AXIS: 030
OD_ADD: +2.50
OS_SPHERE: -1.00
OS_CYLINDER: +0.75
OS_CYLINDER: +0.75
OU_VA: 20/20
OD_AXIS: 170
OD_AXIS: 170
OS_ADD: +2.50
OS_VA1: 20/20-2
OD_CYLINDER: +2.50
OD_CYLINDER: +2.50
OS_SPHERE: -1.50

## 2025-01-28 ASSESSMENT — REFRACTION_CURRENTRX
OD_SPHERE: -2.50
OD_VPRISM_DIRECTION: SV
OS_AXIS: 30
OD_CYLINDER: +2.50
OD_CYLINDER: +2.50
OS_CYLINDER: +0.75
OD_AXIS: 170
OS_OVR_VA: 20/
OS_SPHERE: -1.50
OD_SPHERE: -2.50
OS_OVR_VA: 20/
OS_AXIS: 014
OS_SPHERE: -1.50
OD_OVR_VA: 20/
OS_VPRISM_DIRECTION: SV
OD_OVR_VA: 20/
OS_CYLINDER: +0.75
OD_AXIS: 173

## 2025-01-28 ASSESSMENT — KERATOMETRY
OD_K1POWER_DIOPTERS: 46.75
OS_K1POWER_DIOPTERS: 46.00
OD_K2POWER_DIOPTERS: 47.50
OS_AXISANGLE_DEGREES: 79
OS_K2POWER_DIOPTERS: 46.75
OD_AXISANGLE_DEGREES: 161

## 2025-01-28 ASSESSMENT — REFRACTION_AUTOREFRACTION
OS_CYLINDER: +0.50
OD_CYLINDER: +2.25
OD_SPHERE: -1.75
OS_SPHERE: -1.00
OS_AXIS: 050
OD_AXIS: 169

## 2025-01-28 ASSESSMENT — VISUAL ACUITY: OS_BCVA: 20/20-1

## 2025-02-26 NOTE — ASSESSMENT
Pt needs new RX [facility] for re-enrollment. Enrollment ended at [facility] Feb 2025.   [FreeTextEntry1] : PLAN 8-10-20\par \par -CAD risk age, pre-DM, obesity, family hx, HTN and dyslipidemia. Sob has improved since last visit and patient has remained stable. Echo 12-16-19 ef 60-65% and nuclear stres test 11-12-18 , mild fixed defect with no evidence of ischemia. Given stablitiy of symptoms and past testing per [facility](stress testing, not necessary at this time), we will schedule echocardiogram at the end of the year and f/u with PV after. She is aware with any changes in symptoms or status additional evaluation would be advised. Red flag sx that would warrant emergent evaluaiton was reviewed with the patient. \par \par -hyperlipidemia. Triglycerides increasing. 377. Currenlty on Atorvastatin 10mg daily. Reviewed lifestyle modification (pt states she does not have much room to change) Increase Atorvastatin to 20mg daily, Rx. given to follow lipid/hep/ck in 6weeks. \par \par -HTN controlled. \par \par -KEVIN. uses CPAP. \par \par -asthma, follows with pulmonary. Dr. Funk, has f/u in september for repeat PFT. \par \par -Pre-DM, hypothyroid. To be followed by endo\par \par f/u 4 months after echo, sooner if changes in symptoms or status. \par \par Sincerely,\par \par Anjali Triana PA-C\par patient reviewed and plan advised by supervising physician Dr. Flowers\par \par \par \par

## 2025-04-07 ENCOUNTER — APPOINTMENT (OUTPATIENT)
Dept: PULMONOLOGY | Facility: CLINIC | Age: 65
End: 2025-04-07
Payer: MEDICARE

## 2025-04-07 VITALS
HEIGHT: 64 IN | TEMPERATURE: 98 F | HEART RATE: 70 BPM | SYSTOLIC BLOOD PRESSURE: 143 MMHG | WEIGHT: 293 LBS | DIASTOLIC BLOOD PRESSURE: 74 MMHG | OXYGEN SATURATION: 97 % | BODY MASS INDEX: 50.02 KG/M2

## 2025-04-07 DIAGNOSIS — J45.40 MODERATE PERSISTENT ASTHMA, UNCOMPLICATED: ICD-10-CM

## 2025-04-07 DIAGNOSIS — G47.33 OBSTRUCTIVE SLEEP APNEA (ADULT) (PEDIATRIC): ICD-10-CM

## 2025-04-07 DIAGNOSIS — R05.3 CHRONIC COUGH: ICD-10-CM

## 2025-04-07 PROCEDURE — 94729 DIFFUSING CAPACITY: CPT

## 2025-04-07 PROCEDURE — ZZZZZ: CPT

## 2025-04-07 PROCEDURE — 94727 GAS DIL/WSHOT DETER LNG VOL: CPT

## 2025-04-07 PROCEDURE — 94010 BREATHING CAPACITY TEST: CPT

## 2025-04-07 PROCEDURE — 99215 OFFICE O/P EST HI 40 MIN: CPT | Mod: 25

## 2025-04-07 RX ORDER — METOPROLOL SUCCINATE 25 MG/1
25 TABLET, EXTENDED RELEASE ORAL
Refills: 0 | Status: ACTIVE | COMMUNITY

## 2025-04-07 RX ORDER — MEPOLIZUMAB 100 MG/ML
INJECTION, SOLUTION SUBCUTANEOUS
Refills: 0 | Status: ACTIVE | COMMUNITY

## 2025-04-07 RX ORDER — LEVOCETIRIZINE DIHYDROCHLORIDE 5 MG/1
5 TABLET ORAL
Refills: 0 | Status: ACTIVE | COMMUNITY

## 2025-04-07 RX ORDER — FLUTICASONE FUROATE, UMECLIDINIUM BROMIDE AND VILANTEROL TRIFENATATE 200; 62.5; 25 UG/1; UG/1; UG/1
200-62.5-25 POWDER RESPIRATORY (INHALATION)
Refills: 0 | Status: ACTIVE | COMMUNITY

## 2025-04-07 RX ORDER — MECLIZINE HYDROCHLORIDE 25 MG/1
TABLET ORAL
Refills: 0 | Status: ACTIVE | COMMUNITY

## 2025-05-07 ENCOUNTER — APPOINTMENT (OUTPATIENT)
Dept: PEDIATRIC ALLERGY IMMUNOLOGY | Facility: CLINIC | Age: 65
End: 2025-05-07

## 2025-07-07 ENCOUNTER — NON-APPOINTMENT (OUTPATIENT)
Age: 65
End: 2025-07-07

## 2025-08-15 ENCOUNTER — OFFICE (OUTPATIENT)
Facility: LOCATION | Age: 65
Setting detail: OPHTHALMOLOGY
End: 2025-08-15
Payer: MEDICARE

## 2025-08-15 DIAGNOSIS — E11.9: ICD-10-CM

## 2025-08-15 DIAGNOSIS — H53.9: ICD-10-CM

## 2025-08-15 DIAGNOSIS — Z13.5: ICD-10-CM

## 2025-08-15 PROCEDURE — 92250 FUNDUS PHOTOGRAPHY W/I&R: CPT | Performed by: OPHTHALMOLOGY

## 2025-08-15 PROCEDURE — 99213 OFFICE O/P EST LOW 20 MIN: CPT | Performed by: OPHTHALMOLOGY

## 2025-08-15 ASSESSMENT — REFRACTION_AUTOREFRACTION
OD_SPHERE: -1.75
OS_SPHERE: -0.50
OS_AXIS: 035
OD_AXIS: 169
OS_CYLINDER: +0.75
OD_CYLINDER: +2.50

## 2025-08-15 ASSESSMENT — KERATOMETRY
OD_K2POWER_DIOPTERS: 47.50
OD_AXISANGLE_DEGREES: 161
OD_K1POWER_DIOPTERS: 46.75
OS_K2POWER_DIOPTERS: 46.75
OS_K1POWER_DIOPTERS: 46.00
OS_AXISANGLE_DEGREES: 79

## 2025-08-15 ASSESSMENT — REFRACTION_CURRENTRX
OD_OVR_VA: 20/
OS_OVR_VA: 20/
OD_OVR_VA: 20/
OD_AXIS: 170
OD_SPHERE: -2.50
OD_VPRISM_DIRECTION: SV
OS_CYLINDER: +0.75
OS_AXIS: 30
OD_CYLINDER: +2.50
OS_OVR_VA: 20/
OS_AXIS: 030
OD_SPHERE: -2.00
OS_VPRISM_DIRECTION: SV
OD_AXIS: 170
OS_SPHERE: -1.00
OD_CYLINDER: +2.50
OS_CYLINDER: +0.75
OS_SPHERE: -1.50

## 2025-08-15 ASSESSMENT — SUPERFICIAL PUNCTATE KERATITIS (SPK)
OS_SPK: T 1+
OD_SPK: T 1+

## 2025-08-15 ASSESSMENT — REFRACTION_MANIFEST
OS_AXIS: 30
OS_VA1: 20/20-2
OD_VA1: 20/25
OD_AXIS: 170
OD_CYLINDER: +2.50
OS_ADD: +2.50
OS_SPHERE: -1.00
OU_VA: 20/20
OS_CYLINDER: +0.75
OD_SPHERE: -2.50
OU_VA: 20/20
OS_SPHERE: -1.50
OD_CYLINDER: +2.50
OS_AXIS: 030
OD_SPHERE: -2.00
OD_ADD: +2.50
OD_AXIS: 170
OS_CYLINDER: +0.75
OD_VA1: 20/20-1
OS_VA1: 20/20-1

## 2025-08-15 ASSESSMENT — TONOMETRY
OD_IOP_MMHG: 18
OS_IOP_MMHG: 18

## 2025-08-15 ASSESSMENT — VISUAL ACUITY
OD_BCVA: 20/20
OS_BCVA: 20/20

## 2025-08-15 ASSESSMENT — LID EXAM ASSESSMENTS
OS_BLEPHARITIS: LLL LUL T
OD_BLEPHARITIS: RLL RUL T

## 2025-08-15 ASSESSMENT — LID POSITION - DERMATOCHALASIS
OD_DERMATOCHALASIS: RLL RUL T
OS_DERMATOCHALASIS: LLL LUL T

## 2025-08-15 ASSESSMENT — CONFRONTATIONAL VISUAL FIELD TEST (CVF)
OD_FINDINGS: FULL
OS_FINDINGS: FULL

## 2025-08-23 ENCOUNTER — NON-APPOINTMENT (OUTPATIENT)
Age: 65
End: 2025-08-23